# Patient Record
Sex: MALE | Race: ASIAN | NOT HISPANIC OR LATINO
[De-identification: names, ages, dates, MRNs, and addresses within clinical notes are randomized per-mention and may not be internally consistent; named-entity substitution may affect disease eponyms.]

---

## 2021-07-15 ENCOUNTER — APPOINTMENT (OUTPATIENT)
Dept: THORACIC SURGERY | Facility: CLINIC | Age: 76
End: 2021-07-15
Payer: MEDICARE

## 2021-07-15 VITALS
SYSTOLIC BLOOD PRESSURE: 102 MMHG | DIASTOLIC BLOOD PRESSURE: 67 MMHG | HEART RATE: 99 BPM | RESPIRATION RATE: 16 BRPM | OXYGEN SATURATION: 96 % | BODY MASS INDEX: 19.7 KG/M2 | TEMPERATURE: 97.6 F | WEIGHT: 114 LBS | HEIGHT: 63.78 IN

## 2021-07-15 DIAGNOSIS — F17.200 NICOTINE DEPENDENCE, UNSPECIFIED, UNCOMPLICATED: ICD-10-CM

## 2021-07-15 DIAGNOSIS — Z86.11 PERSONAL HISTORY OF TUBERCULOSIS: ICD-10-CM

## 2021-07-15 DIAGNOSIS — Z86.39 PERSONAL HISTORY OF OTHER ENDOCRINE, NUTRITIONAL AND METABOLIC DISEASE: ICD-10-CM

## 2021-07-15 DIAGNOSIS — R91.1 SOLITARY PULMONARY NODULE: ICD-10-CM

## 2021-07-15 DIAGNOSIS — Z87.438 PERSONAL HISTORY OF OTHER DISEASES OF MALE GENITAL ORGANS: ICD-10-CM

## 2021-07-15 PROCEDURE — 99072 ADDL SUPL MATRL&STAF TM PHE: CPT

## 2021-07-15 PROCEDURE — 99205 OFFICE O/P NEW HI 60 MIN: CPT

## 2021-07-16 PROBLEM — R91.1 LUNG NODULE: Status: ACTIVE | Noted: 2021-07-16

## 2021-07-16 PROBLEM — Z86.11 HISTORY OF TUBERCULOSIS: Status: RESOLVED | Noted: 2021-07-16 | Resolved: 2021-07-16

## 2021-07-16 PROBLEM — Z87.438 HISTORY OF BENIGN PROSTATIC HYPERPLASIA: Status: RESOLVED | Noted: 2021-07-16 | Resolved: 2021-07-16

## 2021-07-16 PROBLEM — F17.200 CURRENT SMOKER: Status: ACTIVE | Noted: 2021-07-16

## 2021-07-16 PROBLEM — Z86.39 HISTORY OF HYPERLIPIDEMIA: Status: RESOLVED | Noted: 2021-07-16 | Resolved: 2021-07-16

## 2021-07-16 RX ORDER — TAMSULOSIN HYDROCHLORIDE 0.4 MG/1
0.4 CAPSULE ORAL
Refills: 0 | Status: ACTIVE | COMMUNITY

## 2021-07-16 RX ORDER — ROSUVASTATIN CALCIUM 10 MG/1
10 TABLET, FILM COATED ORAL
Refills: 0 | Status: ACTIVE | COMMUNITY

## 2021-07-16 RX ORDER — MONTELUKAST 10 MG/1
10 TABLET, FILM COATED ORAL
Refills: 0 | Status: ACTIVE | COMMUNITY

## 2021-07-16 RX ORDER — ASPIRIN 81 MG
81 TABLET, DELAYED RELEASE (ENTERIC COATED) ORAL
Refills: 0 | Status: ACTIVE | COMMUNITY

## 2021-07-16 RX ORDER — LORATADINE 10 MG/1
10 TABLET ORAL
Refills: 0 | Status: ACTIVE | COMMUNITY

## 2021-07-16 NOTE — HISTORY OF PRESENT ILLNESS
[FreeTextEntry1] : Mr. PACO CASAREZ, 76 year old male, current smoker, w/ hx of HLD, +T.B. in the past (not treated), who presented to PCP Dr. Osmar Sutton for routine physical exam, sent for lung CA screening due to smoking hx. First CT scan in Oct 2020 which showed lung nodule.\par \par Repeat CT Chest on 6/26/21:\par - an increasing size 1.9 x 1.4 x 1cm ISIDRO nodule (4:55; previously 1.7 x 1.3 x 0.8cm)\par \par Of note, patient received Moderna vaccines on 3/30/21 and 4/27/21.\par Patient is here today for CT Sx consultation, referred by Dr. Osmar Sutton.\par

## 2021-07-16 NOTE — CONSULT LETTER
[Dear  ___] : Dear  [unfilled], [Consult Letter:] : I had the pleasure of evaluating your patient, [unfilled]. [( Thank you for referring [unfilled] for consultation for _____ )] : Thank you for referring [unfilled] for consultation for [unfilled] [Please see my note below.] : Please see my note below. [Consult Closing:] : Thank you very much for allowing me to participate in the care of this patient.  If you have any questions, please do not hesitate to contact me. [Sincerely,] : Sincerely, [FreeTextEntry2] : Dr. Osmar Sutton (PCP/Referring) [FreeTextEntry3] : Remigio Moore MD, MPH \par System Director of Thoracic Surgery \par Director of Comprehensive Lung and Foregut [place] \par Professor Cardiovascular & Thoracic Surgery  \par [facility]\par \par [facility]\par [address]\par Oncology [address]\[place] [address]\par Tel: [phone]\par Fax: [phone]\par

## 2021-07-16 NOTE — ASSESSMENT
[FreeTextEntry1] : Mr. PACO CASAREZ, 76 year old male, current smoker, w/ hx of HLD, +T.B. in the past (not treated), who presented with lung nodule.\par \par CT Chest on 6/26/21:\par - an increasing size 1.9 x 1.4 x 1cm ISIDRO nodule (4:55; previously 1.7 x 1.3 x 0.8cm)\par \par I have reviewed the patient's medical records and diagnostic images at time of this office consultation and have made the following recommendation:\par 1. CT scan reviewed, increasing size ISIDRO nodule is highly suspicious for malignancy, I recommended a Lt VATS Robotic-assisted, ISIDRO wedge rxn, possible LULobectomy, MLND on 8/11/21. Risks and benefits and alternatives explained to patient, all questions answered, patient agreed to proceed with surgery.\par 2. PFTs\par 3. PET/CT\par 4. Medical clearance and PST\par 5. HOLD ASA 81mg for 1 week prior to surgery.\par \par \par I personally performed the services described in the documentation, reviewed the documentation recorded by the scribe in my presence and it accurately and completely records my words and actions.\par \par I, Shukri Fabian NP, am scribing for and the presence of KENDELL Vaz, the following sections HISTORY OF PRESENT ILLNESS, PAST MEDICAL/FAMILY/SOCIAL HISTORY; REVIEW OF SYSTEMS; VITAL SIGNS; PHYSICAL EXAM; DISPOSITION.\par \par

## 2021-07-27 ENCOUNTER — OUTPATIENT (OUTPATIENT)
Dept: OUTPATIENT SERVICES | Facility: HOSPITAL | Age: 76
LOS: 1 days | End: 2021-07-27
Payer: MEDICARE

## 2021-07-27 VITALS
OXYGEN SATURATION: 96 % | HEART RATE: 69 BPM | TEMPERATURE: 98 F | HEIGHT: 63 IN | WEIGHT: 108.03 LBS | RESPIRATION RATE: 18 BRPM | DIASTOLIC BLOOD PRESSURE: 64 MMHG | SYSTOLIC BLOOD PRESSURE: 108 MMHG

## 2021-07-27 DIAGNOSIS — Z98.890 OTHER SPECIFIED POSTPROCEDURAL STATES: Chronic | ICD-10-CM

## 2021-07-27 DIAGNOSIS — K08.89 OTHER SPECIFIED DISORDERS OF TEETH AND SUPPORTING STRUCTURES: ICD-10-CM

## 2021-07-27 DIAGNOSIS — R91.8 OTHER NONSPECIFIC ABNORMAL FINDING OF LUNG FIELD: ICD-10-CM

## 2021-07-27 DIAGNOSIS — Z78.9 OTHER SPECIFIED HEALTH STATUS: ICD-10-CM

## 2021-07-27 LAB
ALBUMIN SERPL ELPH-MCNC: 4.6 G/DL — SIGNIFICANT CHANGE UP (ref 3.3–5)
ALP SERPL-CCNC: 115 U/L — SIGNIFICANT CHANGE UP (ref 40–120)
ALT FLD-CCNC: 29 U/L — SIGNIFICANT CHANGE UP (ref 4–41)
ANION GAP SERPL CALC-SCNC: 13 MMOL/L — SIGNIFICANT CHANGE UP (ref 7–14)
AST SERPL-CCNC: 18 U/L — SIGNIFICANT CHANGE UP (ref 4–40)
BILIRUB SERPL-MCNC: 0.4 MG/DL — SIGNIFICANT CHANGE UP (ref 0.2–1.2)
BLD GP AB SCN SERPL QL: NEGATIVE — SIGNIFICANT CHANGE UP
BUN SERPL-MCNC: 21 MG/DL — SIGNIFICANT CHANGE UP (ref 7–23)
CALCIUM SERPL-MCNC: 9.2 MG/DL — SIGNIFICANT CHANGE UP (ref 8.4–10.5)
CHLORIDE SERPL-SCNC: 109 MMOL/L — HIGH (ref 98–107)
CO2 SERPL-SCNC: 21 MMOL/L — LOW (ref 22–31)
CREAT SERPL-MCNC: 0.7 MG/DL — SIGNIFICANT CHANGE UP (ref 0.5–1.3)
GLUCOSE SERPL-MCNC: 128 MG/DL — HIGH (ref 70–99)
HCT VFR BLD CALC: 49.8 % — SIGNIFICANT CHANGE UP (ref 39–50)
HGB BLD-MCNC: 16.8 G/DL — SIGNIFICANT CHANGE UP (ref 13–17)
MCHC RBC-ENTMCNC: 32.3 PG — SIGNIFICANT CHANGE UP (ref 27–34)
MCHC RBC-ENTMCNC: 33.7 GM/DL — SIGNIFICANT CHANGE UP (ref 32–36)
MCV RBC AUTO: 95.8 FL — SIGNIFICANT CHANGE UP (ref 80–100)
NRBC # BLD: 0 /100 WBCS — SIGNIFICANT CHANGE UP
NRBC # FLD: 0 K/UL — SIGNIFICANT CHANGE UP
PLATELET # BLD AUTO: 303 K/UL — SIGNIFICANT CHANGE UP (ref 150–400)
POTASSIUM SERPL-MCNC: 3.6 MMOL/L — SIGNIFICANT CHANGE UP (ref 3.5–5.3)
POTASSIUM SERPL-SCNC: 3.6 MMOL/L — SIGNIFICANT CHANGE UP (ref 3.5–5.3)
PROT SERPL-MCNC: 7 G/DL — SIGNIFICANT CHANGE UP (ref 6–8.3)
RBC # BLD: 5.2 M/UL — SIGNIFICANT CHANGE UP (ref 4.2–5.8)
RBC # FLD: 13.7 % — SIGNIFICANT CHANGE UP (ref 10.3–14.5)
RH IG SCN BLD-IMP: POSITIVE — SIGNIFICANT CHANGE UP
SODIUM SERPL-SCNC: 143 MMOL/L — SIGNIFICANT CHANGE UP (ref 135–145)
WBC # BLD: 6.53 K/UL — SIGNIFICANT CHANGE UP (ref 3.8–10.5)
WBC # FLD AUTO: 6.53 K/UL — SIGNIFICANT CHANGE UP (ref 3.8–10.5)

## 2021-07-27 PROCEDURE — 93010 ELECTROCARDIOGRAM REPORT: CPT

## 2021-07-27 RX ORDER — SODIUM CHLORIDE 9 MG/ML
1000 INJECTION, SOLUTION INTRAVENOUS
Refills: 0 | Status: DISCONTINUED | OUTPATIENT
Start: 2021-08-11 | End: 2021-08-15

## 2021-07-27 RX ORDER — SODIUM CHLORIDE 9 MG/ML
3 INJECTION INTRAMUSCULAR; INTRAVENOUS; SUBCUTANEOUS EVERY 8 HOURS
Refills: 0 | Status: DISCONTINUED | OUTPATIENT
Start: 2021-08-11 | End: 2021-08-11

## 2021-07-27 NOTE — H&P PST ADULT - ASSESSMENT
77 y/o male Mandarin speaking presents with pre op diagnosis: other nonspecific abnormal finding of lung field

## 2021-07-27 NOTE — H&P PST ADULT - HISTORY OF PRESENT ILLNESS
77 y/o  male Mandarin speaking with   1 yr h/o lung nodule  75 y/o  male Mandarin speaking with h/o Dyslipidemia, BPH presents to PST for pre op evaluation with 1 year h/o lung nodule. S/P Ct scan of lung which revealed that nodule has increased in size. Now scheduled for robotic assisted left VAT, left upper lobe wedge resection, possible left upper lobectomy, mediastinal lymph node dissection on 08/11/2021  Interpretation services offered, pt refused. Requested family to interpret.

## 2021-07-27 NOTE — H&P PST ADULT - NSICDXPROBLEM_GEN_ALL_CORE_FT
PROBLEM DIAGNOSES  Problem: Other nonspecific abnormal finding of lung field  Assessment and Plan: Scheduled for robotic assisted left video assisted thoracoscopy, left upper lobe wedge resection, possible left upper lobectomy, mediastinal lymph node dissection on 08/11/2021. Pre op instructions, famotidine, chlorhexidine gluconate soap given and explained. Pt/family verbalized understanding.   Moderna vaccine received, OR booking notified via fax    Problem: Language barrier  Assessment and Plan: Mandarin speaking    Problem: Loose, teeth  Assessment and Plan: Pt/daughter in law Iris instructed to see dentist for consultation pre op.  Pending dental consult.

## 2021-08-07 PROBLEM — E78.5 HYPERLIPIDEMIA, UNSPECIFIED: Chronic | Status: ACTIVE | Noted: 2021-07-27

## 2021-08-07 PROBLEM — N40.0 BENIGN PROSTATIC HYPERPLASIA WITHOUT LOWER URINARY TRACT SYMPTOMS: Chronic | Status: ACTIVE | Noted: 2021-07-27

## 2021-08-09 ENCOUNTER — APPOINTMENT (OUTPATIENT)
Dept: DISASTER EMERGENCY | Facility: CLINIC | Age: 76
End: 2021-08-09

## 2021-08-09 DIAGNOSIS — Z01.818 ENCOUNTER FOR OTHER PREPROCEDURAL EXAMINATION: ICD-10-CM

## 2021-08-10 LAB — SARS-COV-2 N GENE NPH QL NAA+PROBE: NOT DETECTED

## 2021-08-11 ENCOUNTER — APPOINTMENT (OUTPATIENT)
Dept: THORACIC SURGERY | Facility: HOSPITAL | Age: 76
End: 2021-08-11

## 2021-08-11 ENCOUNTER — INPATIENT (INPATIENT)
Facility: HOSPITAL | Age: 76
LOS: 5 days | Discharge: HOME HEALTH | End: 2021-08-17
Attending: THORACIC SURGERY (CARDIOTHORACIC VASCULAR SURGERY) | Admitting: THORACIC SURGERY (CARDIOTHORACIC VASCULAR SURGERY)
Payer: MEDICARE

## 2021-08-11 ENCOUNTER — RESULT REVIEW (OUTPATIENT)
Age: 76
End: 2021-08-11

## 2021-08-11 VITALS
HEIGHT: 63 IN | HEART RATE: 60 BPM | OXYGEN SATURATION: 99 % | WEIGHT: 108.03 LBS | RESPIRATION RATE: 16 BRPM | SYSTOLIC BLOOD PRESSURE: 110 MMHG | TEMPERATURE: 98 F | DIASTOLIC BLOOD PRESSURE: 58 MMHG

## 2021-08-11 DIAGNOSIS — R91.8 OTHER NONSPECIFIC ABNORMAL FINDING OF LUNG FIELD: ICD-10-CM

## 2021-08-11 DIAGNOSIS — Z98.890 OTHER SPECIFIED POSTPROCEDURAL STATES: Chronic | ICD-10-CM

## 2021-08-11 LAB — RH IG SCN BLD-IMP: POSITIVE — SIGNIFICANT CHANGE UP

## 2021-08-11 PROCEDURE — 88307 TISSUE EXAM BY PATHOLOGIST: CPT | Mod: 26

## 2021-08-11 PROCEDURE — 32663 THORACOSCOPY W/LOBECTOMY: CPT

## 2021-08-11 PROCEDURE — 88342 IMHCHEM/IMCYTCHM 1ST ANTB: CPT | Mod: 26

## 2021-08-11 PROCEDURE — 32674 THORACOSCOPY LYMPH NODE EXC: CPT

## 2021-08-11 PROCEDURE — 99233 SBSQ HOSP IP/OBS HIGH 50: CPT

## 2021-08-11 PROCEDURE — S2900 ROBOTIC SURGICAL SYSTEM: CPT | Mod: NC

## 2021-08-11 PROCEDURE — 88309 TISSUE EXAM BY PATHOLOGIST: CPT | Mod: 26

## 2021-08-11 PROCEDURE — 32668 THORACOSCOPY W/W RESECT DIAG: CPT

## 2021-08-11 PROCEDURE — 32674 THORACOSCOPY LYMPH NODE EXC: CPT | Mod: AS

## 2021-08-11 PROCEDURE — 88341 IMHCHEM/IMCYTCHM EA ADD ANTB: CPT | Mod: 26

## 2021-08-11 PROCEDURE — 32668 THORACOSCOPY W/W RESECT DIAG: CPT | Mod: AS

## 2021-08-11 PROCEDURE — 32663 THORACOSCOPY W/LOBECTOMY: CPT | Mod: AS

## 2021-08-11 PROCEDURE — 88313 SPECIAL STAINS GROUP 2: CPT | Mod: 26

## 2021-08-11 PROCEDURE — 71045 X-RAY EXAM CHEST 1 VIEW: CPT | Mod: 26

## 2021-08-11 PROCEDURE — 88305 TISSUE EXAM BY PATHOLOGIST: CPT | Mod: 26

## 2021-08-11 RX ORDER — ASPIRIN/CALCIUM CARB/MAGNESIUM 324 MG
81 TABLET ORAL DAILY
Refills: 0 | Status: DISCONTINUED | OUTPATIENT
Start: 2021-08-11 | End: 2021-08-17

## 2021-08-11 RX ORDER — HYDROMORPHONE HYDROCHLORIDE 2 MG/ML
30 INJECTION INTRAMUSCULAR; INTRAVENOUS; SUBCUTANEOUS
Refills: 0 | Status: DISCONTINUED | OUTPATIENT
Start: 2021-08-11 | End: 2021-08-12

## 2021-08-11 RX ORDER — CHOLECALCIFEROL (VITAMIN D3) 125 MCG
1 CAPSULE ORAL
Qty: 0 | Refills: 0 | DISCHARGE

## 2021-08-11 RX ORDER — ACETAMINOPHEN 500 MG
750 TABLET ORAL ONCE
Refills: 0 | Status: DISCONTINUED | OUTPATIENT
Start: 2021-08-12 | End: 2021-08-12

## 2021-08-11 RX ORDER — HYDROMORPHONE HYDROCHLORIDE 2 MG/ML
0.5 INJECTION INTRAMUSCULAR; INTRAVENOUS; SUBCUTANEOUS
Refills: 0 | Status: DISCONTINUED | OUTPATIENT
Start: 2021-08-11 | End: 2021-08-12

## 2021-08-11 RX ORDER — SENNA PLUS 8.6 MG/1
2 TABLET ORAL AT BEDTIME
Refills: 0 | Status: DISCONTINUED | OUTPATIENT
Start: 2021-08-11 | End: 2021-08-17

## 2021-08-11 RX ORDER — HEPARIN SODIUM 5000 [USP'U]/ML
2500 INJECTION INTRAVENOUS; SUBCUTANEOUS ONCE
Refills: 0 | Status: COMPLETED | OUTPATIENT
Start: 2021-08-11 | End: 2021-08-11

## 2021-08-11 RX ORDER — TAMSULOSIN HYDROCHLORIDE 0.4 MG/1
0.4 CAPSULE ORAL AT BEDTIME
Refills: 0 | Status: DISCONTINUED | OUTPATIENT
Start: 2021-08-11 | End: 2021-08-17

## 2021-08-11 RX ORDER — NALOXONE HYDROCHLORIDE 4 MG/.1ML
0.1 SPRAY NASAL
Refills: 0 | Status: DISCONTINUED | OUTPATIENT
Start: 2021-08-11 | End: 2021-08-17

## 2021-08-11 RX ORDER — ASPIRIN/CALCIUM CARB/MAGNESIUM 324 MG
1 TABLET ORAL
Qty: 0 | Refills: 0 | DISCHARGE

## 2021-08-11 RX ORDER — ACETAMINOPHEN 500 MG
650 TABLET ORAL ONCE
Refills: 0 | Status: COMPLETED | OUTPATIENT
Start: 2021-08-11 | End: 2021-08-11

## 2021-08-11 RX ORDER — NALBUPHINE HYDROCHLORIDE 10 MG/ML
2.5 INJECTION, SOLUTION INTRAMUSCULAR; INTRAVENOUS; SUBCUTANEOUS EVERY 6 HOURS
Refills: 0 | Status: DISCONTINUED | OUTPATIENT
Start: 2021-08-11 | End: 2021-08-12

## 2021-08-11 RX ORDER — HEPARIN SODIUM 5000 [USP'U]/ML
2500 INJECTION INTRAVENOUS; SUBCUTANEOUS EVERY 12 HOURS
Refills: 0 | Status: DISCONTINUED | OUTPATIENT
Start: 2021-08-11 | End: 2021-08-17

## 2021-08-11 RX ORDER — ATORVASTATIN CALCIUM 80 MG/1
40 TABLET, FILM COATED ORAL AT BEDTIME
Refills: 0 | Status: DISCONTINUED | OUTPATIENT
Start: 2021-08-11 | End: 2021-08-17

## 2021-08-11 RX ORDER — BUDESONIDE AND FORMOTEROL FUMARATE DIHYDRATE 160; 4.5 UG/1; UG/1
2 AEROSOL RESPIRATORY (INHALATION)
Qty: 0 | Refills: 0 | DISCHARGE

## 2021-08-11 RX ORDER — BUDESONIDE AND FORMOTEROL FUMARATE DIHYDRATE 160; 4.5 UG/1; UG/1
2 AEROSOL RESPIRATORY (INHALATION)
Refills: 0 | Status: DISCONTINUED | OUTPATIENT
Start: 2021-08-11 | End: 2021-08-17

## 2021-08-11 RX ORDER — GABAPENTIN 400 MG/1
100 CAPSULE ORAL ONCE
Refills: 0 | Status: COMPLETED | OUTPATIENT
Start: 2021-08-11 | End: 2021-08-11

## 2021-08-11 RX ORDER — ROSUVASTATIN CALCIUM 5 MG/1
1 TABLET ORAL
Qty: 0 | Refills: 0 | DISCHARGE

## 2021-08-11 RX ORDER — POLYETHYLENE GLYCOL 3350 17 G/17G
17 POWDER, FOR SOLUTION ORAL DAILY
Refills: 0 | Status: DISCONTINUED | OUTPATIENT
Start: 2021-08-11 | End: 2021-08-17

## 2021-08-11 RX ORDER — DIPHENHYDRAMINE HCL 50 MG
25 CAPSULE ORAL EVERY 4 HOURS
Refills: 0 | Status: DISCONTINUED | OUTPATIENT
Start: 2021-08-11 | End: 2021-08-12

## 2021-08-11 RX ORDER — ACETAMINOPHEN 500 MG
750 TABLET ORAL ONCE
Refills: 0 | Status: COMPLETED | OUTPATIENT
Start: 2021-08-11 | End: 2021-08-12

## 2021-08-11 RX ORDER — ONDANSETRON 8 MG/1
4 TABLET, FILM COATED ORAL EVERY 6 HOURS
Refills: 0 | Status: DISCONTINUED | OUTPATIENT
Start: 2021-08-11 | End: 2021-08-17

## 2021-08-11 RX ADMIN — Medication 81 MILLIGRAM(S): at 21:41

## 2021-08-11 RX ADMIN — SODIUM CHLORIDE 30 MILLILITER(S): 9 INJECTION, SOLUTION INTRAVENOUS at 10:26

## 2021-08-11 RX ADMIN — HEPARIN SODIUM 2500 UNIT(S): 5000 INJECTION INTRAVENOUS; SUBCUTANEOUS at 10:25

## 2021-08-11 RX ADMIN — SENNA PLUS 2 TABLET(S): 8.6 TABLET ORAL at 21:41

## 2021-08-11 RX ADMIN — HEPARIN SODIUM 2500 UNIT(S): 5000 INJECTION INTRAVENOUS; SUBCUTANEOUS at 19:55

## 2021-08-11 RX ADMIN — ATORVASTATIN CALCIUM 40 MILLIGRAM(S): 80 TABLET, FILM COATED ORAL at 21:41

## 2021-08-11 RX ADMIN — Medication 650 MILLIGRAM(S): at 10:24

## 2021-08-11 RX ADMIN — HYDROMORPHONE HYDROCHLORIDE 30 MILLILITER(S): 2 INJECTION INTRAMUSCULAR; INTRAVENOUS; SUBCUTANEOUS at 19:23

## 2021-08-11 RX ADMIN — GABAPENTIN 100 MILLIGRAM(S): 400 CAPSULE ORAL at 10:24

## 2021-08-11 RX ADMIN — POLYETHYLENE GLYCOL 3350 17 GRAM(S): 17 POWDER, FOR SOLUTION ORAL at 21:40

## 2021-08-11 RX ADMIN — TAMSULOSIN HYDROCHLORIDE 0.4 MILLIGRAM(S): 0.4 CAPSULE ORAL at 21:41

## 2021-08-11 NOTE — PROGRESS NOTE ADULT - SUBJECTIVE AND OBJECTIVE BOX
PACO CASAREZ      76y   Male   MRN-[id number]         No Known Allergies             Daily Height in cm: 160.02 (11 Aug 2021 09:54)    Daily Drug Dosing Weight  Height (cm): 160 (11 Aug 2021 09:54)  Weight (kg): 49 (11 Aug 2021 09:54)  BMI (kg/m2): 19.1 (11 Aug 2021 09:54)  BSA (m2): 1.49 (11 Aug 2021 09:54)    HPI:  77 y/o  male Mandarin speaking with h/o Dyslipidemia, BPH presents to PST for pre op evaluation with 1 year h/o lung nodule. S/P Ct scan of lung which revealed that nodule has increased in size. Now scheduled for robotic assisted left VAT, left upper lobe wedge resection, possible left upper lobectomy, mediastinal lymph node dissection on 08/11/2021  Interpretation services offered, pt refused. Requested family to interpret.   (27 Jul 2021 11:00)      Procedure:  L robotic-assisted VATS ISIDRO wedge resection [frozen c/w NSCLC], completion ISIDRO lobectomy w/MLND  8/11/2021                         Issues:              Lung nodule              Postop pain  HLD  BPH  Chest tube in place      Home Medications:  aspirin 81 mg oral tablet: 1 tab(s) orally once a day; last dose 08/03 (11 Aug 2021 10:13)  Crestor 10 mg oral tablet: 1 tab(s) orally once a day (11 Aug 2021 10:13)  Flomax 0.4 mg oral capsule: 1 cap(s) orally once a day (11 Aug 2021 10:13)  Symbicort 160 mcg-4.5 mcg/inh inhalation aerosol: 2 puff(s) inhaled 2 times a day, As Needed (11 Aug 2021 10:13)  Vitamin D3 1250 mcg (50,000 intl units) oral capsule: 1 cap(s) orally once a week (11 Aug 2021 10:13)      PAST MEDICAL & SURGICAL HISTORY:  BPH (benign prostatic hyperplasia)    Dyslipidemia    H/O left knee surgery  fracture repair; 2014      Vital Signs Last 24 Hrs  T(C): 36.7 (11 Aug 2021 09:54), Max: 36.7 (11 Aug 2021 09:54)  T(F): 98.1 (11 Aug 2021 09:54), Max: 98.1 (11 Aug 2021 09:54)  HR: 94 (11 Aug 2021 15:00) (60 - 94)  BP: 106/60 (11 Aug 2021 15:00) (106/60 - 121/68)  BP(mean): 71 (11 Aug 2021 15:00) (71 - 84)  RR: 19 (11 Aug 2021 15:00) (13 - 22)  SpO2: 93% (11 Aug 2021 15:00) (93% - 99%)  I&O's Detail    11 Aug 2021 07:01  -  11 Aug 2021 15:17  --------------------------------------------------------  IN:    Lactated Ringers: 90 mL  Total IN: 90 mL    OUT:    Chest Tube (mL): 0 mL  Total OUT: 0 mL    Total NET: 90 mL        CAPILLARY BLOOD GLUCOSE      Home Medications:  aspirin 81 mg oral tablet: 1 tab(s) orally once a day; last dose 08/03 (11 Aug 2021 10:13)  Crestor 10 mg oral tablet: 1 tab(s) orally once a day (11 Aug 2021 10:13)  Flomax 0.4 mg oral capsule: 1 cap(s) orally once a day (11 Aug 2021 10:13)  Symbicort 160 mcg-4.5 mcg/inh inhalation aerosol: 2 puff(s) inhaled 2 times a day, As Needed (11 Aug 2021 10:13)  Vitamin D3 1250 mcg (50,000 intl units) oral capsule: 1 cap(s) orally once a week (11 Aug 2021 10:13)      MEDICATIONS  (STANDING):  aspirin  chewable 81 milliGRAM(s) Oral daily  atorvastatin 40 milliGRAM(s) Oral at bedtime  budesonide 160 MICROgram(s)/formoterol 4.5 MICROgram(s) Inhaler 2 Puff(s) Inhalation two times a day  heparin   Injectable 2500 Unit(s) SubCutaneous every 12 hours  HYDROmorphone PCA (1 mG/mL) 30 milliLiter(s) PCA Continuous PCA Continuous  lactated ringers. 1000 milliLiter(s) (30 mL/Hr) IV Continuous <Continuous>  polyethylene glycol 3350 17 Gram(s) Oral daily  senna 2 Tablet(s) Oral at bedtime  sodium chloride 0.9% lock flush 3 milliLiter(s) IV Push every 8 hours  tamsulosin 0.4 milliGRAM(s) Oral at bedtime    MEDICATIONS  (PRN):  diphenhydrAMINE   Injectable 25 milliGRAM(s) IV Push every 4 hours PRN Pruritus  HYDROmorphone PCA (1 mG/mL) Rescue Clinician Bolus 0.5 milliGRAM(s) IV Push every 15 minutes PRN for Pain Scale GREATER THAN 6  nalbuphine Injectable 2.5 milliGRAM(s) IV Push every 6 hours PRN Pruritus  naloxone Injectable 0.1 milliGRAM(s) IV Push every 3 minutes PRN For ANY of the following changes in patient status:  A. RR LESS THAN 10 breaths per minute, B. Oxygen saturation LESS THAN 90%, C. Sedation score of 6  ondansetron Injectable 4 milliGRAM(s) IV Push every 6 hours PRN Nausea        Physical exam:                             General:               Pt is awake, alert,  appears to be in pain but not in  distress                                                  Neuro:                  Nonfocal                             Cardiovascular:   S1 & S2, regular                          Respiratory:         Air entry is fair and equal on both sides, has bilateral conducted sounds                           GI:                          Soft, nondistended and nontender, Bowel sounds active                            Ext:                        No cyanosis or edema     Labs:                                                                   CXR:  Left chest tube in place, lungs are clear    Plan:    General: 76yMale s/p L robotic-assisted VATS ISIDRO wedge resection [frozen c/w NSCLC], completion ISIDRO lobectomy w/MLND  8/11/2021, experiencing  pain with deep breathing.                             Neuro:                                         Pain control with PCA /  Tylenol PRN                            Cardiovascular:                                          Continue hemodynamic monitoring.    HLD: On Lipitor                            Respiratory:                                         Pt is on 2L  nasal canula, wean off as tolerated                                          Comfortable, not in any distress.                                         Encourage incentive spirometry                                          Monitor chest tube output                                         Chest tube to suction, has air leak                                                               Continue bronchodilators, pulmonary toilet                            GI                                         On clear liquids, advance to DASH  diet as tolerated                                         Continue Zofran / Reglan for nausea - PRN	                                                                 Renal:                                         Continue LR  30cc/hr                                         Monitor I/Os and electrolytes     BPH: On Flomax                                                                                        Hem/ Onc:                                                                                  Monitor chest tube output &  signs of bleeding.                                          Follow CBC in AM                           Infectious disease:                                            Monitor for fever / leukocytosis.                                          All surgical incision / chest tube  sites look clean                            Endocrine                                             Continue Accu-Checks with coverage    Pt is on SQ Heparin and Venodyne boots for DVT prophylaxis.     Pertinent clinical, laboratory, radiographic, hemodynamic, echocardiographic, respiratory data, microbiologic data and chart were reviewed and analyzed frequently throughout the course of the day and night  Patient seen, examined and plan discussed with CT Surgeon   / CTICU team during rounds.    Status discussed with patient /  updated plan of care            Vickey Carbajal MD

## 2021-08-11 NOTE — BRIEF OPERATIVE NOTE - ASSISTANT(S)
Jacob Houston PGY6, Mario Gallegos PA-C, Charlie Regalado PA-C Jacob Devi PGY6, Mario Gallegos PA-C, Hua Peterson PA-C

## 2021-08-11 NOTE — BRIEF OPERATIVE NOTE - COMMENTS
ICS: Flovent 44mcg 2p BID - 0, miss asthma s/s:<2x/week, no nighttime cough, JACOB use:<2x/week, +EIB, activity limits, no known food and environmental triggers -colds and weather changes main trigger.
***This note is incomplete, to be completed after completion of the case

## 2021-08-11 NOTE — BRIEF OPERATIVE NOTE - OPERATION/FINDINGS
76M current smoker h/o TB (not treated) p/w 2x1.5cm ISIDRO nodule 76M current smoker h/o TB (not treated) p/w 2x1.5cm ISIDRO nodule now s/p L robotic-assisted VATS ISIDRO wedge resection [frozen c/w NSCLC], completion ISIRDO lobectomy w/MLND.

## 2021-08-12 LAB
ANION GAP SERPL CALC-SCNC: 14 MMOL/L — SIGNIFICANT CHANGE UP (ref 7–14)
ANION GAP SERPL CALC-SCNC: 17 MMOL/L — HIGH (ref 7–14)
BUN SERPL-MCNC: 20 MG/DL — SIGNIFICANT CHANGE UP (ref 7–23)
BUN SERPL-MCNC: 20 MG/DL — SIGNIFICANT CHANGE UP (ref 7–23)
CALCIUM SERPL-MCNC: 8.4 MG/DL — SIGNIFICANT CHANGE UP (ref 8.4–10.5)
CALCIUM SERPL-MCNC: 9 MG/DL — SIGNIFICANT CHANGE UP (ref 8.4–10.5)
CHLORIDE SERPL-SCNC: 101 MMOL/L — SIGNIFICANT CHANGE UP (ref 98–107)
CHLORIDE SERPL-SCNC: 102 MMOL/L — SIGNIFICANT CHANGE UP (ref 98–107)
CO2 SERPL-SCNC: 19 MMOL/L — LOW (ref 22–31)
CO2 SERPL-SCNC: 22 MMOL/L — SIGNIFICANT CHANGE UP (ref 22–31)
CREAT SERPL-MCNC: 0.61 MG/DL — SIGNIFICANT CHANGE UP (ref 0.5–1.3)
CREAT SERPL-MCNC: 0.68 MG/DL — SIGNIFICANT CHANGE UP (ref 0.5–1.3)
GLUCOSE SERPL-MCNC: 106 MG/DL — HIGH (ref 70–99)
GLUCOSE SERPL-MCNC: 107 MG/DL — HIGH (ref 70–99)
HCT VFR BLD CALC: 46.1 % — SIGNIFICANT CHANGE UP (ref 39–50)
HGB BLD-MCNC: 15.3 G/DL — SIGNIFICANT CHANGE UP (ref 13–17)
MAGNESIUM SERPL-MCNC: 1.9 MG/DL — SIGNIFICANT CHANGE UP (ref 1.6–2.6)
MCHC RBC-ENTMCNC: 31.8 PG — SIGNIFICANT CHANGE UP (ref 27–34)
MCHC RBC-ENTMCNC: 33.2 GM/DL — SIGNIFICANT CHANGE UP (ref 32–36)
MCV RBC AUTO: 95.8 FL — SIGNIFICANT CHANGE UP (ref 80–100)
NRBC # BLD: 0 /100 WBCS — SIGNIFICANT CHANGE UP
NRBC # FLD: 0 K/UL — SIGNIFICANT CHANGE UP
PHOSPHATE SERPL-MCNC: 3.9 MG/DL — SIGNIFICANT CHANGE UP (ref 2.5–4.5)
PLATELET # BLD AUTO: 344 K/UL — SIGNIFICANT CHANGE UP (ref 150–400)
POTASSIUM SERPL-MCNC: 4.2 MMOL/L — SIGNIFICANT CHANGE UP (ref 3.5–5.3)
POTASSIUM SERPL-MCNC: 5.7 MMOL/L — HIGH (ref 3.5–5.3)
POTASSIUM SERPL-SCNC: 4.2 MMOL/L — SIGNIFICANT CHANGE UP (ref 3.5–5.3)
POTASSIUM SERPL-SCNC: 5.7 MMOL/L — HIGH (ref 3.5–5.3)
RBC # BLD: 4.81 M/UL — SIGNIFICANT CHANGE UP (ref 4.2–5.8)
RBC # FLD: 13.7 % — SIGNIFICANT CHANGE UP (ref 10.3–14.5)
SODIUM SERPL-SCNC: 137 MMOL/L — SIGNIFICANT CHANGE UP (ref 135–145)
SODIUM SERPL-SCNC: 138 MMOL/L — SIGNIFICANT CHANGE UP (ref 135–145)
WBC # BLD: 10.91 K/UL — HIGH (ref 3.8–10.5)
WBC # FLD AUTO: 10.91 K/UL — HIGH (ref 3.8–10.5)

## 2021-08-12 PROCEDURE — 99233 SBSQ HOSP IP/OBS HIGH 50: CPT

## 2021-08-12 PROCEDURE — 71045 X-RAY EXAM CHEST 1 VIEW: CPT | Mod: 26

## 2021-08-12 RX ORDER — ACETAMINOPHEN 500 MG
650 TABLET ORAL EVERY 6 HOURS
Refills: 0 | Status: COMPLETED | OUTPATIENT
Start: 2021-08-12 | End: 2021-08-14

## 2021-08-12 RX ORDER — OXYCODONE HYDROCHLORIDE 5 MG/1
10 TABLET ORAL
Refills: 0 | Status: DISCONTINUED | OUTPATIENT
Start: 2021-08-12 | End: 2021-08-17

## 2021-08-12 RX ORDER — HYDROMORPHONE HYDROCHLORIDE 2 MG/ML
0.3 INJECTION INTRAMUSCULAR; INTRAVENOUS; SUBCUTANEOUS
Refills: 0 | Status: DISCONTINUED | OUTPATIENT
Start: 2021-08-12 | End: 2021-08-12

## 2021-08-12 RX ORDER — OXYCODONE HYDROCHLORIDE 5 MG/1
5 TABLET ORAL
Refills: 0 | Status: DISCONTINUED | OUTPATIENT
Start: 2021-08-12 | End: 2021-08-17

## 2021-08-12 RX ORDER — LIDOCAINE 4 G/100G
1 CREAM TOPICAL EVERY 24 HOURS
Refills: 0 | Status: DISCONTINUED | OUTPATIENT
Start: 2021-08-12 | End: 2021-08-12

## 2021-08-12 RX ORDER — METOCLOPRAMIDE HCL 10 MG
10 TABLET ORAL THREE TIMES A DAY
Refills: 0 | Status: DISCONTINUED | OUTPATIENT
Start: 2021-08-12 | End: 2021-08-17

## 2021-08-12 RX ORDER — ACETAMINOPHEN 500 MG
750 TABLET ORAL ONCE
Refills: 0 | Status: COMPLETED | OUTPATIENT
Start: 2021-08-12 | End: 2021-08-12

## 2021-08-12 RX ORDER — LIDOCAINE 4 G/100G
1 CREAM TOPICAL EVERY 24 HOURS
Refills: 0 | Status: COMPLETED | OUTPATIENT
Start: 2021-08-12 | End: 2021-08-16

## 2021-08-12 RX ADMIN — Medication 650 MILLIGRAM(S): at 16:00

## 2021-08-12 RX ADMIN — BUDESONIDE AND FORMOTEROL FUMARATE DIHYDRATE 2 PUFF(S): 160; 4.5 AEROSOL RESPIRATORY (INHALATION) at 22:41

## 2021-08-12 RX ADMIN — Medication 81 MILLIGRAM(S): at 12:58

## 2021-08-12 RX ADMIN — HEPARIN SODIUM 2500 UNIT(S): 5000 INJECTION INTRAVENOUS; SUBCUTANEOUS at 22:07

## 2021-08-12 RX ADMIN — OXYCODONE HYDROCHLORIDE 10 MILLIGRAM(S): 5 TABLET ORAL at 13:09

## 2021-08-12 RX ADMIN — SODIUM CHLORIDE 30 MILLILITER(S): 9 INJECTION, SOLUTION INTRAVENOUS at 09:46

## 2021-08-12 RX ADMIN — OXYCODONE HYDROCHLORIDE 5 MILLIGRAM(S): 5 TABLET ORAL at 18:12

## 2021-08-12 RX ADMIN — HYDROMORPHONE HYDROCHLORIDE 0.5 MILLIGRAM(S): 2 INJECTION INTRAMUSCULAR; INTRAVENOUS; SUBCUTANEOUS at 07:18

## 2021-08-12 RX ADMIN — HEPARIN SODIUM 2500 UNIT(S): 5000 INJECTION INTRAVENOUS; SUBCUTANEOUS at 07:52

## 2021-08-12 RX ADMIN — Medication 650 MILLIGRAM(S): at 15:06

## 2021-08-12 RX ADMIN — Medication 650 MILLIGRAM(S): at 22:13

## 2021-08-12 RX ADMIN — Medication 650 MILLIGRAM(S): at 23:13

## 2021-08-12 RX ADMIN — HYDROMORPHONE HYDROCHLORIDE 30 MILLILITER(S): 2 INJECTION INTRAMUSCULAR; INTRAVENOUS; SUBCUTANEOUS at 07:36

## 2021-08-12 RX ADMIN — OXYCODONE HYDROCHLORIDE 10 MILLIGRAM(S): 5 TABLET ORAL at 12:58

## 2021-08-12 RX ADMIN — Medication 300 MILLIGRAM(S): at 09:46

## 2021-08-12 RX ADMIN — TAMSULOSIN HYDROCHLORIDE 0.4 MILLIGRAM(S): 0.4 CAPSULE ORAL at 22:14

## 2021-08-12 RX ADMIN — POLYETHYLENE GLYCOL 3350 17 GRAM(S): 17 POWDER, FOR SOLUTION ORAL at 12:58

## 2021-08-12 RX ADMIN — BUDESONIDE AND FORMOTEROL FUMARATE DIHYDRATE 2 PUFF(S): 160; 4.5 AEROSOL RESPIRATORY (INHALATION) at 09:48

## 2021-08-12 RX ADMIN — Medication 750 MILLIGRAM(S): at 10:06

## 2021-08-12 RX ADMIN — ATORVASTATIN CALCIUM 40 MILLIGRAM(S): 80 TABLET, FILM COATED ORAL at 22:14

## 2021-08-12 RX ADMIN — LIDOCAINE 1 PATCH: 4 CREAM TOPICAL at 17:44

## 2021-08-12 RX ADMIN — LIDOCAINE 1 PATCH: 4 CREAM TOPICAL at 12:59

## 2021-08-12 RX ADMIN — Medication 10 MILLIGRAM(S): at 09:47

## 2021-08-12 RX ADMIN — SENNA PLUS 2 TABLET(S): 8.6 TABLET ORAL at 22:13

## 2021-08-12 NOTE — PROGRESS NOTE ADULT - SUBJECTIVE AND OBJECTIVE BOX
Anesthesia Pain Management Service- Attending Addendum    SUBJECTIVE: Patient's pain control adequate    Therapy:	  [ X] IV PCA	   [ ] Epidural           [ ] s/p Spinal Opoid              [ ] Postpartum infusion	  [ ] Patient controlled regional anesthesia (PCRA)    [ ] prn Analgesics    Allergies    No Known Allergies    Intolerances      MEDICATIONS  (STANDING):  acetaminophen    Suspension .. 650 milliGRAM(s) Oral every 6 hours  aspirin  chewable 81 milliGRAM(s) Oral daily  atorvastatin 40 milliGRAM(s) Oral at bedtime  budesonide 160 MICROgram(s)/formoterol 4.5 MICROgram(s) Inhaler 2 Puff(s) Inhalation two times a day  heparin   Injectable 2500 Unit(s) SubCutaneous every 12 hours  lactated ringers. 1000 milliLiter(s) (30 mL/Hr) IV Continuous <Continuous>  lidocaine   5% Patch 1 Patch Transdermal every 24 hours  polyethylene glycol 3350 17 Gram(s) Oral daily  senna 2 Tablet(s) Oral at bedtime  tamsulosin 0.4 milliGRAM(s) Oral at bedtime    MEDICATIONS  (PRN):  metoclopramide 10 milliGRAM(s) Oral three times a day PRN nausea  naloxone Injectable 0.1 milliGRAM(s) IV Push every 3 minutes PRN For ANY of the following changes in patient status:  A. RR LESS THAN 10 breaths per minute, B. Oxygen saturation LESS THAN 90%, C. Sedation score of 6  ondansetron Injectable 4 milliGRAM(s) IV Push every 6 hours PRN Nausea  oxyCODONE    Solution 5 milliGRAM(s) Oral every 3 hours PRN Moderate Pain (4 - 6)  oxyCODONE    Solution 10 milliGRAM(s) Oral every 3 hours PRN Severe Pain (7 - 10)      OBJECTIVE:   [X] No new signs     [ ] Other:    Side Effects:  [X ] None			[ ] Other:      ASSESSMENT/PLAN  -Discontinue current therapy    [ ] Therapy changed to:    [ ] IV PCA       [ ] Epidural     [ X] prn Analgesics     Comments: Pain management per primary team, APS to sign off

## 2021-08-12 NOTE — PROGRESS NOTE ADULT - SUBJECTIVE AND OBJECTIVE BOX
CHIEF COMPLAINT: F/u for post-operative in CT ICU for a patient who is s/p ISIDRO resection    PROCEDURE:     76M current smoker h/o TB (not treated) p/w 2x1.5cm ISIDRO nodule now s/p L robotic-assisted VATS ISIDRO wedge resection [frozen c/w NSCLC], completion ISIDRO lobectomy w/MLND.        ISSUES:                 Lung nodule              Postop pain  HLD  BPH  Chest tube in place      INTERVAL EVENTS:     Complains of dizziness on standing, pain      HISTORY:   Patient reports moderate pain at chest wall incision sites which is worse with coughing and deep breathing without associated fever or dyspnea. Pain is improved with use of pain meds.     PHYSICAL EXAM:   Gen: Comfortable, No acute distress  Eyes: Sclera white, Conjunctiva normal, Eyelids normal, Pupils symmetrical   ENT: Mucous membranes moist,  ,  ,    Neck: Trachea midline,  ,  ,  ,  ,    CV: Rate regular, Rhythm regular,  ,  ,    Resp: Breath sounds clear, No accessory muscles use, L chest tube in place,    Abd: Soft, Non-distended, Non-tender, Bowel sounds normal,  ,  ,    Skin: Warm, No peripheral edema of lower extremities,  ,    : No pardo  Neuro: Moving all 4 extremities,    Psych: A&Ox3      ASSESSMENT AND PLAN:     NEURO:  Post-operative Pain - Pain control with PCA and Tylenol IV PRN.          RESPIRATORY:  Hypoxia - Wean nasal cannula for goal O2sat above 92. Obtain CXR. Incentive spirometry. Chest PT and frequent suctioning. Continue bronchodilators. OOB to chair & ambulate w/ assistance. Continuous pulse oximetry for support & to prevent decompensation.       Chest tube – Pleurevac regulated suctioning. Monitor chest tube output.   +airleak, keep to suction          CARDIOVASCULAR:  Hemodynamically stable - Not on pressors. Continue hemodynamic monitoring.            RENAL:  Stable - Monitor IOs and electrolytes. Keep K above 4.0 and Mg above 2.0. Repeat K 4.2  Straight cath for urinary retention, monitor bladder scan. Continue flomax         GASTROINTESTINAL:  GI prophylaxis not indicated  Zofran and Reglan IV PRN for nausea  Regular consistency diet            HEMATOLOGIC:  No signs of active bleeding. Monitor Hgb in CBC in AM  DVT prophylaxis with heparin subQ and SCDs.         INFECTIOUS DISEASE:  No signs of active infection. Will monitor for fever and leukocytosis.          ENDOCRINE:  Stable – Monitor glucose fingersticks for goal 120-180.          Pertinent clinical, laboratory, radiographic, telemetry, hemodynamic, echocardiographic, respiratory data, microbiologic data and chart were reviewed by myself and analyzed frequently throughout the course of the day and night by myself.    Plan discussed at length with the CTICU staff and Attending CT Surgeon Dr. Remigio Moore    Patient's status was discussed with patient at bedside.           ________________________________________________      _________________________  VITAL SIGNS:  Vital Signs Last 24 Hrs  T(C): 36.7 (12 Aug 2021 12:00), Max: 36.7 (12 Aug 2021 12:00)  T(F): 98 (12 Aug 2021 12:00), Max: 98 (12 Aug 2021 12:00)  HR: 78 (12 Aug 2021 12:00) (62 - 105)  BP: 98/58 (12 Aug 2021 12:00) (89/55 - 121/68)  BP(mean): 65 (12 Aug 2021 12:00) (56 - 84)  RR: 22 (12 Aug 2021 12:00) (10 - 27)  SpO2: 95% (12 Aug 2021 12:00) (92% - 97%)  I/Os:   I&O's Detail    11 Aug 2021 07:01  -  12 Aug 2021 07:00  --------------------------------------------------------  IN:    Lactated Ringers: 570 mL  Total IN: 570 mL    OUT:    Chest Tube (mL): 10 mL    Intermittent Catheterization - Urethral (mL): 800 mL  Total OUT: 810 mL    Total NET: -240 mL      12 Aug 2021 07:01  -  12 Aug 2021 12:49  --------------------------------------------------------  IN:  Total IN: 0 mL    OUT:    Intermittent Catheterization - Urethral (mL): 650 mL  Total OUT: 650 mL    Total NET: -650 mL              MEDICATIONS:  MEDICATIONS  (STANDING):  acetaminophen    Suspension .. 650 milliGRAM(s) Oral every 6 hours  aspirin  chewable 81 milliGRAM(s) Oral daily  atorvastatin 40 milliGRAM(s) Oral at bedtime  budesonide 160 MICROgram(s)/formoterol 4.5 MICROgram(s) Inhaler 2 Puff(s) Inhalation two times a day  heparin   Injectable 2500 Unit(s) SubCutaneous every 12 hours  lactated ringers. 1000 milliLiter(s) (30 mL/Hr) IV Continuous <Continuous>  lidocaine   5% Patch 1 Patch Transdermal every 24 hours  polyethylene glycol 3350 17 Gram(s) Oral daily  senna 2 Tablet(s) Oral at bedtime  tamsulosin 0.4 milliGRAM(s) Oral at bedtime    MEDICATIONS  (PRN):  metoclopramide 10 milliGRAM(s) Oral three times a day PRN nausea  naloxone Injectable 0.1 milliGRAM(s) IV Push every 3 minutes PRN For ANY of the following changes in patient status:  A. RR LESS THAN 10 breaths per minute, B. Oxygen saturation LESS THAN 90%, C. Sedation score of 6  ondansetron Injectable 4 milliGRAM(s) IV Push every 6 hours PRN Nausea  oxyCODONE    Solution 5 milliGRAM(s) Oral every 3 hours PRN Moderate Pain (4 - 6)  oxyCODONE    Solution 10 milliGRAM(s) Oral every 3 hours PRN Severe Pain (7 - 10)      LABS:  Laboratory data was independently reviewed by me today.                           15.3   10.91 )-----------( 344      ( 12 Aug 2021 05:41 )             46.1     08-12    138  |  102  |  20  ----------------------------<  107<H>  4.2   |  22  |  0.68    Ca    9.0      12 Aug 2021 08:34  Phos  3.9     08-12  Mg     1.90     08-12                RADIOLOGY:   Radiology images were independently reviewed by me today. Reports were reviewed by me today.    Xray Chest 1 View- PORTABLE-Routine:   EXAM:  XR CHEST PORTABLE ROUTINE 1V      EXAM:  XR CHEST PORTABLE URGENT 1V        PROCEDURE DATE:  Aug 11 2021         INTERPRETATION:  TIME OF EXAM: August 11, 2021 at 1:27 PM and August 12 at 4:15 AM    CLINICAL INFORMATION: Post thoracotomy forlung cancer.    TECHNIQUE:   Portable chest    INTERPRETATION:    August 11 at 1:27 PM:    Left-sided chest tube is present. The lungs are clear, the heart is not enlarged and there is no effusion or pneumothorax.    August 12 at 4:15 AM:  No interval change. Left-sided chest tube is present post thoracotomy with loss of volume in this lung.    Lungs remain clear and there is no complicating effusion or pneumothorax.      COMPARISON:  None available      IMPRESSION:  Status post left thoracotomy with chest tube without complications.    --- End of Report ---              ASHLIE BANKS MD; Attending Radiologist  This document has been electronically signed. Aug 12 2021  7:15AM (08-12-21 @ 05:03)  Xray Chest 1 View- PORTABLE-Urgent:   EXAM:  XR CHEST PORTABLE ROUTINE 1V      EXAM:  XR CHEST PORTABLE URGENT 1V        PROCEDURE DATE:  Aug 11 2021         INTERPRETATION:  TIME OF EXAM: August 11, 2021 at 1:27 PM and August 12 at 4:15 AM    CLINICAL INFORMATION: Post thoracotomy forlung cancer.    TECHNIQUE:   Portable chest    INTERPRETATION:    August 11 at 1:27 PM:    Left-sided chest tube is present. The lungs are clear, the heart is not enlarged and there is no effusion or pneumothorax.    August 12 at 4:15 AM:  No interval change. Left-sided chest tube is present post thoracotomy with loss of volume in this lung.    Lungs remain clear and there is no complicating effusion or pneumothorax.      COMPARISON:  None available      IMPRESSION:  Status post left thoracotomy with chest tube without complications.    --- End of Report ---              ASHLIE BANKS MD; Attending Radiologist  This document has been electronically signed. Aug 12 2021  7:15AM (08-11-21 @ 13:58)

## 2021-08-12 NOTE — PROGRESS NOTE ADULT - SUBJECTIVE AND OBJECTIVE BOX
POST ANESTHESIA EVALUATION    76y Male POSTOP DAY 1 S/P     MENTAL STATUS: Patient participation [ x ] Awake     [  ] Arousable     [  ] Sedated    AIRWAY PATENCY: [ x ] Satisfactory  [  ] Other:     Vital Signs Last 24 Hrs  T(C): 36.7 (12 Aug 2021 12:00), Max: 36.7 (12 Aug 2021 12:00)  T(F): 98 (12 Aug 2021 12:00), Max: 98 (12 Aug 2021 12:00)  HR: 80 (12 Aug 2021 13:00) (62 - 105)  BP: 99/52 (12 Aug 2021 13:00) (89/55 - 121/68)  BP(mean): 63 (12 Aug 2021 13:00) (56 - 84)  RR: 23 (12 Aug 2021 13:00) (10 - 27)  SpO2: 94% (12 Aug 2021 13:00) (92% - 97%)  I&O's Summary    11 Aug 2021 07:01  -  12 Aug 2021 07:00  --------------------------------------------------------  IN: 570 mL / OUT: 810 mL / NET: -240 mL    12 Aug 2021 07:01  -  12 Aug 2021 13:26  --------------------------------------------------------  IN: 150 mL / OUT: 650 mL / NET: -500 mL          NAUSEA/ VOMITTING:  [ x ] NONE  [  ] CONTROLLED [  ] OTHER     PAIN: [x  ] CONTROLLED WITH CURRENT REGIMEN  [  ] OTHER    [x  ] NO APPARENT ANESTHESIA COMPLICATIONS      Comments:

## 2021-08-12 NOTE — PROGRESS NOTE ADULT - SUBJECTIVE AND OBJECTIVE BOX
Anesthesia Pain Management Service    SUBJECTIVE: Patient primarily Cantonese speaking.  ID# [id number] used. Patient states that he is been suing the IV PCA but he is not sure if he is getting any pain relief Patient's RN states that the patient is very dizzy when he tried getting OOB.    Pain Scale Score	At rest: 6/10___ 	With Activity: ___ 	[X ] Refer to charted pain scores    THERAPY:    [ ] IV PCA Morphine		[ ] 5 mg/mL	[ ] 1 mg/mL  [X ] IV PCA Hydromorphone	[ ] 5 mg/mL	[X ] 1 mg/mL  [ ] IV PCA Fentanyl		[ ] 50 micrograms/mL    Demand dose __0.2_ lockout __6_ (minutes) Continuous Rate _0__ Total: __7.4_   mg used (in past 24 hrs)      MEDICATIONS  (STANDING):  acetaminophen    Suspension .. 650 milliGRAM(s) Oral every 6 hours  aspirin  chewable 81 milliGRAM(s) Oral daily  atorvastatin 40 milliGRAM(s) Oral at bedtime  budesonide 160 MICROgram(s)/formoterol 4.5 MICROgram(s) Inhaler 2 Puff(s) Inhalation two times a day  heparin   Injectable 2500 Unit(s) SubCutaneous every 12 hours  lactated ringers. 1000 milliLiter(s) (30 mL/Hr) IV Continuous <Continuous>  lidocaine   4% Patch 1 Patch Transdermal every 24 hours  polyethylene glycol 3350 17 Gram(s) Oral daily  senna 2 Tablet(s) Oral at bedtime  tamsulosin 0.4 milliGRAM(s) Oral at bedtime    MEDICATIONS  (PRN):  metoclopramide 10 milliGRAM(s) Oral three times a day PRN nausea  naloxone Injectable 0.1 milliGRAM(s) IV Push every 3 minutes PRN For ANY of the following changes in patient status:  A. RR LESS THAN 10 breaths per minute, B. Oxygen saturation LESS THAN 90%, C. Sedation score of 6  ondansetron Injectable 4 milliGRAM(s) IV Push every 6 hours PRN Nausea  oxyCODONE    Solution 5 milliGRAM(s) Oral every 3 hours PRN Moderate Pain (4 - 6)  oxyCODONE    Solution 10 milliGRAM(s) Oral every 3 hours PRN Severe Pain (7 - 10)      OBJECTIVE: Patient laying in bed. CTX1 in place.    Sedation Score:	[ X] Alert	[ ] Drowsy 	[ ] Arousable	[ ] Asleep	[ ] Unresponsive    Side Effects:	[X ] None	[ ] Nausea	[ ] Vomiting	[ ] Pruritus  		[ ] Other:    Vital Signs Last 24 Hrs  T(C): 36.3 (12 Aug 2021 04:00), Max: 36.5 (11 Aug 2021 16:00)  T(F): 97.3 (12 Aug 2021 04:00), Max: 97.7 (11 Aug 2021 16:00)  HR: 62 (12 Aug 2021 11:00) (62 - 105)  BP: 89/55 (12 Aug 2021 11:00) (89/55 - 121/68)  BP(mean): 63 (12 Aug 2021 11:00) (56 - 84)  RR: 14 (12 Aug 2021 11:00) (10 - 27)  SpO2: 95% (12 Aug 2021 11:00) (92% - 97%)    ASSESSMENT/ PLAN    Therapy to  be:	[ ] Continue   [ X] Discontinued   [X ] Change to prn Analgesics    Documentation and Verification of current medications:   [X] Done	[ ] Not done, not elligible    Comments: Discussed patient with primary team. IV PCA discontinued. PRN Oral/IV opioids and/or Adjuvant non-opioid medication to be ordered at this point.    Progress Note written now but Patient was seen earlier.

## 2021-08-13 LAB
ANION GAP SERPL CALC-SCNC: 15 MMOL/L — HIGH (ref 7–14)
BUN SERPL-MCNC: 22 MG/DL — SIGNIFICANT CHANGE UP (ref 7–23)
CALCIUM SERPL-MCNC: 8.8 MG/DL — SIGNIFICANT CHANGE UP (ref 8.4–10.5)
CHLORIDE SERPL-SCNC: 102 MMOL/L — SIGNIFICANT CHANGE UP (ref 98–107)
CO2 SERPL-SCNC: 22 MMOL/L — SIGNIFICANT CHANGE UP (ref 22–31)
CREAT SERPL-MCNC: 0.78 MG/DL — SIGNIFICANT CHANGE UP (ref 0.5–1.3)
GLUCOSE SERPL-MCNC: 108 MG/DL — HIGH (ref 70–99)
HCT VFR BLD CALC: 45.1 % — SIGNIFICANT CHANGE UP (ref 39–50)
HGB BLD-MCNC: 15.3 G/DL — SIGNIFICANT CHANGE UP (ref 13–17)
MCHC RBC-ENTMCNC: 32 PG — SIGNIFICANT CHANGE UP (ref 27–34)
MCHC RBC-ENTMCNC: 33.9 GM/DL — SIGNIFICANT CHANGE UP (ref 32–36)
MCV RBC AUTO: 94.4 FL — SIGNIFICANT CHANGE UP (ref 80–100)
NRBC # BLD: 0 /100 WBCS — SIGNIFICANT CHANGE UP
NRBC # FLD: 0 K/UL — SIGNIFICANT CHANGE UP
PLATELET # BLD AUTO: 275 K/UL — SIGNIFICANT CHANGE UP (ref 150–400)
POTASSIUM SERPL-MCNC: 3.9 MMOL/L — SIGNIFICANT CHANGE UP (ref 3.5–5.3)
POTASSIUM SERPL-SCNC: 3.9 MMOL/L — SIGNIFICANT CHANGE UP (ref 3.5–5.3)
RBC # BLD: 4.78 M/UL — SIGNIFICANT CHANGE UP (ref 4.2–5.8)
RBC # FLD: 13.8 % — SIGNIFICANT CHANGE UP (ref 10.3–14.5)
SODIUM SERPL-SCNC: 139 MMOL/L — SIGNIFICANT CHANGE UP (ref 135–145)
WBC # BLD: 10.46 K/UL — SIGNIFICANT CHANGE UP (ref 3.8–10.5)
WBC # FLD AUTO: 10.46 K/UL — SIGNIFICANT CHANGE UP (ref 3.8–10.5)

## 2021-08-13 PROCEDURE — 71045 X-RAY EXAM CHEST 1 VIEW: CPT | Mod: 26,76

## 2021-08-13 RX ADMIN — HEPARIN SODIUM 2500 UNIT(S): 5000 INJECTION INTRAVENOUS; SUBCUTANEOUS at 08:55

## 2021-08-13 RX ADMIN — TAMSULOSIN HYDROCHLORIDE 0.4 MILLIGRAM(S): 0.4 CAPSULE ORAL at 22:13

## 2021-08-13 RX ADMIN — Medication 650 MILLIGRAM(S): at 04:45

## 2021-08-13 RX ADMIN — SENNA PLUS 2 TABLET(S): 8.6 TABLET ORAL at 22:12

## 2021-08-13 RX ADMIN — LIDOCAINE 1 PATCH: 4 CREAM TOPICAL at 18:08

## 2021-08-13 RX ADMIN — BUDESONIDE AND FORMOTEROL FUMARATE DIHYDRATE 2 PUFF(S): 160; 4.5 AEROSOL RESPIRATORY (INHALATION) at 08:44

## 2021-08-13 RX ADMIN — POLYETHYLENE GLYCOL 3350 17 GRAM(S): 17 POWDER, FOR SOLUTION ORAL at 11:56

## 2021-08-13 RX ADMIN — ATORVASTATIN CALCIUM 40 MILLIGRAM(S): 80 TABLET, FILM COATED ORAL at 22:13

## 2021-08-13 RX ADMIN — Medication 650 MILLIGRAM(S): at 22:42

## 2021-08-13 RX ADMIN — OXYCODONE HYDROCHLORIDE 5 MILLIGRAM(S): 5 TABLET ORAL at 09:55

## 2021-08-13 RX ADMIN — Medication 650 MILLIGRAM(S): at 22:12

## 2021-08-13 RX ADMIN — HEPARIN SODIUM 2500 UNIT(S): 5000 INJECTION INTRAVENOUS; SUBCUTANEOUS at 20:18

## 2021-08-13 RX ADMIN — Medication 650 MILLIGRAM(S): at 17:25

## 2021-08-13 RX ADMIN — LIDOCAINE 1 PATCH: 4 CREAM TOPICAL at 00:00

## 2021-08-13 RX ADMIN — Medication 81 MILLIGRAM(S): at 11:57

## 2021-08-13 RX ADMIN — Medication 650 MILLIGRAM(S): at 11:56

## 2021-08-13 RX ADMIN — LIDOCAINE 1 PATCH: 4 CREAM TOPICAL at 20:20

## 2021-08-13 RX ADMIN — BUDESONIDE AND FORMOTEROL FUMARATE DIHYDRATE 2 PUFF(S): 160; 4.5 AEROSOL RESPIRATORY (INHALATION) at 20:18

## 2021-08-13 RX ADMIN — OXYCODONE HYDROCHLORIDE 5 MILLIGRAM(S): 5 TABLET ORAL at 08:55

## 2021-08-13 NOTE — PROGRESS NOTE ADULT - SUBJECTIVE AND OBJECTIVE BOX
Subjective: no acute complaints  pt admits discomfort at chest tube site, taking oral pain medications  OOB ambulating with minimal assistance  chest tube placed to waterseal today, chest tube remains for air leak  voiding without complaints    Vital Signs:  Vital Signs Last 24 Hrs  T(C): 36.9 (08-13-21 @ 07:04), Max: 37.3 (08-12-21 @ 20:42)  T(F): 98.4 (08-13-21 @ 07:04), Max: 99.2 (08-12-21 @ 20:42)  HR: 87 (08-13-21 @ 07:04) (62 - 87)  BP: 101/51 (08-13-21 @ 07:04) (89/49 - 107/53)  RR: 18 (08-13-21 @ 07:04) (12 - 23)  SpO2: 97% (08-13-21 @ 07:04) (92% - 98%) on (O2)    Telemetry/Alarms: sr  General: WN/WD NAD  Neurology: Awake, nonfocal, MICHAELS x 4  Eyes: Scleras clear, PERRLA/ EOMI, Gross vision intact  ENT:Gross hearing intact, grossly patent pharynx, no stridor  Neck: Neck supple, trachea midline, No JVD,   Respiratory: CTA B/L, No wheezing, rales, rhonchi  CV: RRR, S1S2, no murmurs, rubs or gallops  Abdominal: Soft, NT, ND +BS,   Extremities: No edema, + peripheral pulses  Skin: No Rashes, Hematoma, Ecchymosis  Lymphatic: No Neck, axilla, groin LAD  Psych: Oriented x 3, normal affect  Incisions: c,d,i  Tubes: chest tube was on suction and placed to waterseal today; chest tube drained 150cc/24h with expiratory air leak  Relevant labs, radiology and Medications reviewed                        15.3   10.46 )-----------( 275      ( 13 Aug 2021 06:46 )             45.1     08-13    139  |  102  |  22  ----------------------------<  108<H>  3.9   |  22  |  0.78    Ca    8.8      13 Aug 2021 06:46  Phos  3.9     08-12  Mg     1.90     08-12        MEDICATIONS  (STANDING):  acetaminophen    Suspension .. 650 milliGRAM(s) Oral every 6 hours  aspirin  chewable 81 milliGRAM(s) Oral daily  atorvastatin 40 milliGRAM(s) Oral at bedtime  budesonide 160 MICROgram(s)/formoterol 4.5 MICROgram(s) Inhaler 2 Puff(s) Inhalation two times a day  heparin   Injectable 2500 Unit(s) SubCutaneous every 12 hours  lactated ringers. 1000 milliLiter(s) (30 mL/Hr) IV Continuous <Continuous>  lidocaine   5% Patch 1 Patch Transdermal every 24 hours  polyethylene glycol 3350 17 Gram(s) Oral daily  senna 2 Tablet(s) Oral at bedtime  tamsulosin 0.4 milliGRAM(s) Oral at bedtime    MEDICATIONS  (PRN):  metoclopramide 10 milliGRAM(s) Oral three times a day PRN nausea  naloxone Injectable 0.1 milliGRAM(s) IV Push every 3 minutes PRN For ANY of the following changes in patient status:  A. RR LESS THAN 10 breaths per minute, B. Oxygen saturation LESS THAN 90%, C. Sedation score of 6  ondansetron Injectable 4 milliGRAM(s) IV Push every 6 hours PRN Nausea  oxyCODONE    Solution 10 milliGRAM(s) Oral every 3 hours PRN Severe Pain (7 - 10)  oxyCODONE    Solution 5 milliGRAM(s) Oral every 3 hours PRN Moderate Pain (4 - 6)    Pertinent Physical Exam  I&O's Summary    12 Aug 2021 07:01  -  13 Aug 2021 07:00  --------------------------------------------------------  IN: 150 mL / OUT: 1650 mL / NET: -1500 mL        Assessment  76y Male  w/ PAST MEDICAL & SURGICAL HISTORY:  BPH (benign prostatic hyperplasia)  Dyslipidemia  H/O left knee surgery  fracture repair; 2014    s/p ISIDRO wedge resection, completion lobectomy on 8/11/2021    . Postoperative course/issues: chest tube remains for air leak, pt initially required straight cath but is now urinating without difficulty    PLAN  Neuro: Pain management  Pulm: Encourage coughing, deep breathing and use of incentive spirometry. Wean off supplemental oxygen as able. Daily CXR.   Cardio: Monitor telemetry/alarms  GI: Tolerating diet. Continue stool softeners.  Renal: monitor urine output, supplement electrolytes as needed  Vasc: Heparin SC/SCDs for DVT prophylaxis  Heme: Stable H/H. .   ID: Off antibiotics. Stable.  Therapy: OOB/ambulate  Tubes: Monitor Chest tube output and air leak status  Disposition: Aim to D/C to home once air leak resolves and chest tube removed; waterseal trial today - will f/u repeat CXR  Discussed with Cardiothoracic Team at AM rounds.

## 2021-08-14 PROCEDURE — 71045 X-RAY EXAM CHEST 1 VIEW: CPT | Mod: 26

## 2021-08-14 RX ADMIN — LIDOCAINE 1 PATCH: 4 CREAM TOPICAL at 06:54

## 2021-08-14 RX ADMIN — OXYCODONE HYDROCHLORIDE 5 MILLIGRAM(S): 5 TABLET ORAL at 15:08

## 2021-08-14 RX ADMIN — Medication 81 MILLIGRAM(S): at 12:11

## 2021-08-14 RX ADMIN — Medication 650 MILLIGRAM(S): at 04:01

## 2021-08-14 RX ADMIN — BUDESONIDE AND FORMOTEROL FUMARATE DIHYDRATE 2 PUFF(S): 160; 4.5 AEROSOL RESPIRATORY (INHALATION) at 08:44

## 2021-08-14 RX ADMIN — Medication 650 MILLIGRAM(S): at 04:31

## 2021-08-14 RX ADMIN — BUDESONIDE AND FORMOTEROL FUMARATE DIHYDRATE 2 PUFF(S): 160; 4.5 AEROSOL RESPIRATORY (INHALATION) at 20:20

## 2021-08-14 RX ADMIN — OXYCODONE HYDROCHLORIDE 5 MILLIGRAM(S): 5 TABLET ORAL at 08:44

## 2021-08-14 RX ADMIN — POLYETHYLENE GLYCOL 3350 17 GRAM(S): 17 POWDER, FOR SOLUTION ORAL at 12:11

## 2021-08-14 RX ADMIN — SENNA PLUS 2 TABLET(S): 8.6 TABLET ORAL at 21:57

## 2021-08-14 RX ADMIN — HEPARIN SODIUM 2500 UNIT(S): 5000 INJECTION INTRAVENOUS; SUBCUTANEOUS at 09:05

## 2021-08-14 RX ADMIN — OXYCODONE HYDROCHLORIDE 5 MILLIGRAM(S): 5 TABLET ORAL at 09:40

## 2021-08-14 RX ADMIN — Medication 650 MILLIGRAM(S): at 12:12

## 2021-08-14 RX ADMIN — ATORVASTATIN CALCIUM 40 MILLIGRAM(S): 80 TABLET, FILM COATED ORAL at 21:57

## 2021-08-14 RX ADMIN — HEPARIN SODIUM 2500 UNIT(S): 5000 INJECTION INTRAVENOUS; SUBCUTANEOUS at 20:20

## 2021-08-14 RX ADMIN — TAMSULOSIN HYDROCHLORIDE 0.4 MILLIGRAM(S): 0.4 CAPSULE ORAL at 21:57

## 2021-08-14 RX ADMIN — LIDOCAINE 1 PATCH: 4 CREAM TOPICAL at 12:11

## 2021-08-14 RX ADMIN — OXYCODONE HYDROCHLORIDE 5 MILLIGRAM(S): 5 TABLET ORAL at 15:48

## 2021-08-14 RX ADMIN — LIDOCAINE 1 PATCH: 4 CREAM TOPICAL at 19:30

## 2021-08-14 NOTE — PROGRESS NOTE ADULT - SUBJECTIVE AND OBJECTIVE BOX
Subjective: no acute complaints  pain controlled  pt ambulatory   failed waterseal trial yesterday    Vital Signs:  Vital Signs Last 24 Hrs  T(C): 36.4 (08-14-21 @ 04:10), Max: 37.3 (08-13-21 @ 12:43)  T(F): 97.5 (08-14-21 @ 04:10), Max: 99.1 (08-13-21 @ 12:43)  HR: 93 (08-14-21 @ 04:10) (83 - 122)  BP: 122/72 (08-14-21 @ 04:10) (90/50 - 122/72)  RR: 18 (08-14-21 @ 04:10) (17 - 18)  SpO2: 98% (08-14-21 @ 04:10) (96% - 99%) on (O2)    Telemetry/Alarms:  General: WN/WD NAD  Neurology: Awake, nonfocal, MICHAELS x 4  Eyes: Scleras clear, PERRLA/ EOMI, Gross vision intact  ENT:Gross hearing intact, grossly patent pharynx, no stridor  Neck: Neck supple, trachea midline, No JVD,   Respiratory: CTA B/L, No wheezing, rales, rhonchi  CV: RRR, S1S2, no murmurs, rubs or gallops  Abdominal: Soft, NT, ND +BS,   Extremities: No edema, + peripheral pulses  Skin: No Rashes, Hematoma, Ecchymosis  Lymphatic: No Neck, axilla, groin LAD  Psych: Oriented x 3, normal affect  Incisions: c,d,i  Tubes: left chest tube on suction drained 220 /24h with expiratory air leak  Relevant labs, radiology and Medications reviewed                        15.3   10.46 )-----------( 275      ( 13 Aug 2021 06:46 )             45.1     08-13    139  |  102  |  22  ----------------------------<  108<H>  3.9   |  22  |  0.78    Ca    8.8      13 Aug 2021 06:46        MEDICATIONS  (STANDING):  acetaminophen    Suspension .. 650 milliGRAM(s) Oral every 6 hours  aspirin  chewable 81 milliGRAM(s) Oral daily  atorvastatin 40 milliGRAM(s) Oral at bedtime  budesonide 160 MICROgram(s)/formoterol 4.5 MICROgram(s) Inhaler 2 Puff(s) Inhalation two times a day  heparin   Injectable 2500 Unit(s) SubCutaneous every 12 hours  lactated ringers. 1000 milliLiter(s) (30 mL/Hr) IV Continuous <Continuous>  lidocaine   5% Patch 1 Patch Transdermal every 24 hours  polyethylene glycol 3350 17 Gram(s) Oral daily  senna 2 Tablet(s) Oral at bedtime  tamsulosin 0.4 milliGRAM(s) Oral at bedtime    MEDICATIONS  (PRN):  metoclopramide 10 milliGRAM(s) Oral three times a day PRN nausea  naloxone Injectable 0.1 milliGRAM(s) IV Push every 3 minutes PRN For ANY of the following changes in patient status:  A. RR LESS THAN 10 breaths per minute, B. Oxygen saturation LESS THAN 90%, C. Sedation score of 6  ondansetron Injectable 4 milliGRAM(s) IV Push every 6 hours PRN Nausea  oxyCODONE    Solution 5 milliGRAM(s) Oral every 3 hours PRN Moderate Pain (4 - 6)  oxyCODONE    Solution 10 milliGRAM(s) Oral every 3 hours PRN Severe Pain (7 - 10)    Pertinent Physical Exam  I&O's Summary    13 Aug 2021 07:01  -  14 Aug 2021 07:00  --------------------------------------------------------  IN: 200 mL / OUT: 1020 mL / NET: -820 mL        Assessment  76y Male  w/ PAST MEDICAL & SURGICAL HISTORY:  BPH (benign prostatic hyperplasia)  Dyslipidemia  H/O left knee surgery  fracture repair; 2014    pt  s/p ISIDRO wedge resection, completion lobectomy (11 Aug 2021 12:48)    . Postoperative course/issues: chest tube remains on suction with air leak, failed waterseal trial yesterday    PLAN  Neuro: Pain management  Pulm: Encourage coughing, deep breathing and use of incentive spirometry. Wean off supplemental oxygen as able. Daily CXR.   Cardio: Monitor telemetry/alarms  GI: Tolerating diet. Continue stool softeners.  Renal: monitor urine output, supplement electrolytes as needed  Vasc: Heparin SC/SCDs for DVT prophylaxis  Heme: Stable H/H. .   ID: Off antibiotics. Stable.  Therapy: OOB/ambulate  Tubes: Monitor Chest tube output and air leak status, continue chest tube to suction  Disposition: Aim to D/C to home once chest tube can be removed  Discussed with Cardiothoracic Team at AM rounds.

## 2021-08-15 PROCEDURE — 71045 X-RAY EXAM CHEST 1 VIEW: CPT | Mod: 26

## 2021-08-15 RX ADMIN — SENNA PLUS 2 TABLET(S): 8.6 TABLET ORAL at 21:51

## 2021-08-15 RX ADMIN — Medication 81 MILLIGRAM(S): at 12:20

## 2021-08-15 RX ADMIN — HEPARIN SODIUM 2500 UNIT(S): 5000 INJECTION INTRAVENOUS; SUBCUTANEOUS at 10:08

## 2021-08-15 RX ADMIN — BUDESONIDE AND FORMOTEROL FUMARATE DIHYDRATE 2 PUFF(S): 160; 4.5 AEROSOL RESPIRATORY (INHALATION) at 21:52

## 2021-08-15 RX ADMIN — OXYCODONE HYDROCHLORIDE 5 MILLIGRAM(S): 5 TABLET ORAL at 13:20

## 2021-08-15 RX ADMIN — LIDOCAINE 1 PATCH: 4 CREAM TOPICAL at 20:00

## 2021-08-15 RX ADMIN — BUDESONIDE AND FORMOTEROL FUMARATE DIHYDRATE 2 PUFF(S): 160; 4.5 AEROSOL RESPIRATORY (INHALATION) at 10:08

## 2021-08-15 RX ADMIN — TAMSULOSIN HYDROCHLORIDE 0.4 MILLIGRAM(S): 0.4 CAPSULE ORAL at 21:52

## 2021-08-15 RX ADMIN — HEPARIN SODIUM 2500 UNIT(S): 5000 INJECTION INTRAVENOUS; SUBCUTANEOUS at 21:51

## 2021-08-15 RX ADMIN — OXYCODONE HYDROCHLORIDE 5 MILLIGRAM(S): 5 TABLET ORAL at 12:20

## 2021-08-15 RX ADMIN — LIDOCAINE 1 PATCH: 4 CREAM TOPICAL at 12:21

## 2021-08-15 RX ADMIN — LIDOCAINE 1 PATCH: 4 CREAM TOPICAL at 00:00

## 2021-08-15 RX ADMIN — ATORVASTATIN CALCIUM 40 MILLIGRAM(S): 80 TABLET, FILM COATED ORAL at 21:51

## 2021-08-15 NOTE — PROGRESS NOTE ADULT - SUBJECTIVE AND OBJECTIVE BOX
Subjective: 75 y/o male seen in room this morning. Patient was sitting up OOBTC but was noted to be ambulating within room prior. Patient continues to become tacchycardic to 110's and SOB with movement, requires 3L NC at all times. Left CT remains in place to sxn with passive AL.   Otherwise patient in NAD, complain on "Pain to chest and back"    Vital Signs:  Vital Signs Last 24 Hrs  T(C): 36.8 (08-15-21 @ 04:34), Max: 37 (08-15-21 @ 01:37)  T(F): 98.2 (08-15-21 @ 04:34), Max: 98.6 (08-15-21 @ 01:37)  HR: 94 (08-15-21 @ 04:34) (87 - 110)  BP: 108/71 (08-15-21 @ 04:34) (97/63 - 114/67)  RR: 18 (08-15-21 @ 04:34) (17 - 18)  SpO2: 96% (08-15-21 @ 04:34) (92% - 100%) on (O2)    Pertinent Physical Exam:  Telemetry/Alarms: NSR 90s at rest, 120's with ambulation.   General: WN/WD NAD  Neurology: Awake, nonfocal, MICHAELS x 4  Eyes: Scleras clear, PERRLA/ EOMI, Gross vision intact  ENT:Gross hearing intact, grossly patent pharynx, no stridor  Neck: Neck supple, trachea midline, No JVD,   Respiratory: CTA B/L, No wheezing, rales, rhonchi  CV: RRR, S1S2, no murmurs, rubs or gallops  Abdominal: Soft, NT, ND +BS,   Extremities: No edema, + peripheral pulses  Skin: No Rashes, Hematoma, Ecchymosis  Lymphatic: No Neck, axilla, groin LAD  Psych: Oriented x 3, normal affect  Incisions: CDI  Tubes: Left with AL    Chest Tube: Left Side    Air Leak: Yes[x] / No[]    Drainage: 50cc    I&O's Summary    14 Aug 2021 07:01  -  15 Aug 2021 07:00  --------------------------------------------------------  IN: 250 mL / OUT: 50 mL / NET: 200 mL        Relevant labs, radiology and Medications reviewed      CXR: 8/15 pending official read  CT on right in place. appears unchaged from yesterday      MEDICATIONS  (STANDING):  aspirin  chewable 81 milliGRAM(s) Oral daily  atorvastatin 40 milliGRAM(s) Oral at bedtime  budesonide 160 MICROgram(s)/formoterol 4.5 MICROgram(s) Inhaler 2 Puff(s) Inhalation two times a day  heparin   Injectable 2500 Unit(s) SubCutaneous every 12 hours  lidocaine   5% Patch 1 Patch Transdermal every 24 hours  polyethylene glycol 3350 17 Gram(s) Oral daily  senna 2 Tablet(s) Oral at bedtime  tamsulosin 0.4 milliGRAM(s) Oral at bedtime    MEDICATIONS  (PRN):  metoclopramide 10 milliGRAM(s) Oral three times a day PRN nausea  naloxone Injectable 0.1 milliGRAM(s) IV Push every 3 minutes PRN For ANY of the following changes in patient status:  A. RR LESS THAN 10 breaths per minute, B. Oxygen saturation LESS THAN 90%, C. Sedation score of 6  ondansetron Injectable 4 milliGRAM(s) IV Push every 6 hours PRN Nausea  oxyCODONE    Solution 5 milliGRAM(s) Oral every 3 hours PRN Moderate Pain (4 - 6)  oxyCODONE    Solution 10 milliGRAM(s) Oral every 3 hours PRN Severe Pain (7 - 10)      Assessment  76M current smoker h/o TB (not treated), HLD, BPH and p/w 2x1.5cm ISIDRO nodule now s/p L robotic-assisted VATS ISIDRO wedge resection [frozen c/w NSCLC], completion ISIDRO lobectomy w/MLND on 8/11/21.     Post op course c/b persistent AL, failed WS trial 8/13 now back to sxn. Also, pt with urinary retention s/p straight cath x2 now voiding.     8/15 Intermittent 1 chamber AL noted, CT to Sxn.      PLAN  Neuro: Pain management, PRN PO oxy alternating with Tylenol  Pulm: Encourage coughing, deep breathing and use of incentive spirometry. Wean off supplemental oxygen as able. Daily CXR. Cont with symbicort and Chest PT. Evaluate need for home O2. Patient currenlty using 3L nc  Cardio: Monitor telemetry/alarms. BP soft, systolic 100-110 but asymptomatic. HR stable. Cont with lipitor for HLD  GI: Tolerating diet. Continue stool softeners. Soft diet  Renal: monitor urine output, supplement electrolytes as needed. Voiding. Hx of BPH cont with flomax  Vasc: Heparin SC/SCDs for DVT prophylaxis  Heme: Stable H/H.   ID: Off antibiotics. Stable.  Therapy: OOB/ambulate  Tubes: Monitor Chest tube output and AL   Disposition: Aim to D/C to home once AL resolves and CT is removed.  Discussed with Cardiothoracic Team at AM rounds.   Subjective: 77 y/o male seen in room this morning. Patient was sitting up OOBTC but was noted to be ambulating within room prior. Patient continues to become tacchycardic to 110's and SOB with movement, requires 3L NC at all times. Left CT remains in place to sxn with passive AL.   Otherwise patient in NAD, complain on "Pain to chest and back"    Vital Signs:  Vital Signs Last 24 Hrs  T(C): 36.8 (08-15-21 @ 04:34), Max: 37 (08-15-21 @ 01:37)  T(F): 98.2 (08-15-21 @ 04:34), Max: 98.6 (08-15-21 @ 01:37)  HR: 94 (08-15-21 @ 04:34) (87 - 110)  BP: 108/71 (08-15-21 @ 04:34) (97/63 - 114/67)  RR: 18 (08-15-21 @ 04:34) (17 - 18)  SpO2: 96% (08-15-21 @ 04:34) (92% - 100%) on (O2)    Pertinent Physical Exam:  Telemetry/Alarms: NSR 90s at rest, 120's with ambulation.   General: WN/WD NAD  Neurology: Awake, nonfocal, MICHAELS x 4  Eyes: Scleras clear, PERRLA/ EOMI, Gross vision intact  ENT:Gross hearing intact, grossly patent pharynx, no stridor  Neck: Neck supple, trachea midline, No JVD,   Respiratory: CTA B/L, No wheezing, rales, rhonchi  CV: RRR, S1S2, no murmurs, rubs or gallops  Abdominal: Soft, NT, ND +BS,   Extremities: No edema, + peripheral pulses  Skin: No Rashes, Hematoma, Ecchymosis  Lymphatic: No Neck, axilla, groin LAD  Psych: Oriented x 3, normal affect  Incisions: CDI  Tubes: Left with AL    Chest Tube: Left Side    Air Leak: Yes[x] / No[]    Drainage: 50cc    I&O's Summary    14 Aug 2021 07:01  -  15 Aug 2021 07:00  --------------------------------------------------------  IN: 250 mL / OUT: 50 mL / NET: 200 mL        Relevant labs, radiology and Medications reviewed      CXR: 8/15 pending official read  CT on right in place. appears unchaged from yesterday      MEDICATIONS  (STANDING):  aspirin  chewable 81 milliGRAM(s) Oral daily  atorvastatin 40 milliGRAM(s) Oral at bedtime  budesonide 160 MICROgram(s)/formoterol 4.5 MICROgram(s) Inhaler 2 Puff(s) Inhalation two times a day  heparin   Injectable 2500 Unit(s) SubCutaneous every 12 hours  lidocaine   5% Patch 1 Patch Transdermal every 24 hours  polyethylene glycol 3350 17 Gram(s) Oral daily  senna 2 Tablet(s) Oral at bedtime  tamsulosin 0.4 milliGRAM(s) Oral at bedtime    MEDICATIONS  (PRN):  metoclopramide 10 milliGRAM(s) Oral three times a day PRN nausea  naloxone Injectable 0.1 milliGRAM(s) IV Push every 3 minutes PRN For ANY of the following changes in patient status:  A. RR LESS THAN 10 breaths per minute, B. Oxygen saturation LESS THAN 90%, C. Sedation score of 6  ondansetron Injectable 4 milliGRAM(s) IV Push every 6 hours PRN Nausea  oxyCODONE    Solution 5 milliGRAM(s) Oral every 3 hours PRN Moderate Pain (4 - 6)  oxyCODONE    Solution 10 milliGRAM(s) Oral every 3 hours PRN Severe Pain (7 - 10)      Assessment  76M current smoker h/o TB (not treated), HLD, BPH and p/w 2x1.5cm ISIDRO nodule now s/p L robotic-assisted VATS ISIDRO wedge resection [frozen c/w NSCLC], completion ISIDRO lobectomy w/MLND on 8/11/21.     Post op course c/b persistent AL, failed WS trial 8/13 now back to sxn. Also, pt with urinary retention s/p straight cath x2 now voiding.     8/15 Intermittent 1 chamber AL noted, CT to Sxn.      PLAN  Neuro: Pain management, PRN PO oxy alternating with Tylenol  Pulm: Encourage coughing, deep breathing and use of incentive spirometry. Wean off supplemental oxygen as able. Daily CXR. Cont with symbicort and Chest PT. Evaluate need for home O2. Patient currenlty using 3L nc  Cardio: Monitor telemetry/alarms. BP soft, systolic 100-110 but asymptomatic. HR stable. Cont with lipitor for HLD  GI: Tolerating diet. Continue stool softeners. Soft diet  Renal: monitor urine output, supplement electrolytes as needed. not Voiding. Hx of BPH cont with flomax. Bladder scan 1200 urine, place pardo.  Vasc: Heparin SC/SCDs for DVT prophylaxis  Heme: Stable H/H.   ID: Off antibiotics. Stable.  Therapy: OOB/ambulate  Tubes: Monitor Chest tube output and AL   Disposition: Aim to D/C to home once AL resolves and CT is removed.  Discussed with Cardiothoracic Team at AM rounds.

## 2021-08-15 NOTE — PROVIDER CONTACT NOTE (OTHER) - RECOMMENDATIONS
Encouraged pt and son multiple times to have pardo inserted and explained the risk of not having a pardo at this time

## 2021-08-16 PROCEDURE — 71045 X-RAY EXAM CHEST 1 VIEW: CPT | Mod: 26,76

## 2021-08-16 RX ORDER — ACETAMINOPHEN 500 MG
650 TABLET ORAL EVERY 6 HOURS
Refills: 0 | Status: DISCONTINUED | OUTPATIENT
Start: 2021-08-16 | End: 2021-08-17

## 2021-08-16 RX ADMIN — BUDESONIDE AND FORMOTEROL FUMARATE DIHYDRATE 2 PUFF(S): 160; 4.5 AEROSOL RESPIRATORY (INHALATION) at 21:26

## 2021-08-16 RX ADMIN — LIDOCAINE 1 PATCH: 4 CREAM TOPICAL at 20:06

## 2021-08-16 RX ADMIN — TAMSULOSIN HYDROCHLORIDE 0.4 MILLIGRAM(S): 0.4 CAPSULE ORAL at 21:26

## 2021-08-16 RX ADMIN — HEPARIN SODIUM 2500 UNIT(S): 5000 INJECTION INTRAVENOUS; SUBCUTANEOUS at 21:24

## 2021-08-16 RX ADMIN — Medication 650 MILLIGRAM(S): at 22:41

## 2021-08-16 RX ADMIN — LIDOCAINE 1 PATCH: 4 CREAM TOPICAL at 12:00

## 2021-08-16 RX ADMIN — SENNA PLUS 2 TABLET(S): 8.6 TABLET ORAL at 21:26

## 2021-08-16 RX ADMIN — Medication 81 MILLIGRAM(S): at 11:51

## 2021-08-16 RX ADMIN — HEPARIN SODIUM 2500 UNIT(S): 5000 INJECTION INTRAVENOUS; SUBCUTANEOUS at 09:46

## 2021-08-16 RX ADMIN — LIDOCAINE 1 PATCH: 4 CREAM TOPICAL at 00:00

## 2021-08-16 RX ADMIN — Medication 650 MILLIGRAM(S): at 23:15

## 2021-08-16 RX ADMIN — ATORVASTATIN CALCIUM 40 MILLIGRAM(S): 80 TABLET, FILM COATED ORAL at 21:25

## 2021-08-16 NOTE — PROGRESS NOTE ADULT - PROVIDER SPECIALTY LIST ADULT
CT Surgery
Critical Care
Pain Medicine
Thoracic Surgery
Thoracic Surgery
Anesthesia
Critical Care
Pain Medicine
Thoracic Surgery

## 2021-08-16 NOTE — PROGRESS NOTE ADULT - SUBJECTIVE AND OBJECTIVE BOX
Subjective: Translation done by Dr. Moore at bedside. Pt c/o urinary retention from yestreday, now with Basilio. Pt instructed he will have to go home and Basilio and see a urologist as an outpt. Pt c.o some pain at CT site. No SOB. OOB w assist. Weaning off O2.     Vital Signs:  Vital Signs Last 24 Hrs  T(C): 36.8 (08-16-21 @ 13:36), Max: 36.9 (08-16-21 @ 01:10)  T(F): 98.2 (08-16-21 @ 13:36), Max: 98.5 (08-16-21 @ 01:10)  HR: 115 (08-16-21 @ 13:36) (86 - 115)  BP: 107/62 (08-16-21 @ 13:36) (100/52 - 122/72)  RR: 18 (08-16-21 @ 13:36) (18 - 18)  SpO2: 95% (08-16-21 @ 13:36) (95% - 100%) on (O2)    Telemetry/Alarms:  General: WN/WD NAD  Neurology: Awake, nonfocal, MICHAELS x 4  Eyes: Scleras clear, PERRLA/ EOMI, Gross vision intact  ENT:Gross hearing intact, grossly patent pharynx, no stridor  Neck: Neck supple, trachea midline, No JVD,   Respiratory: Dec BS to left side. Sml SQ air  CV: RRR, S1S2, no murmurs, rubs or gallops  Abdominal: Soft, NT, ND +BS, + BM 2 days ago  Extremities: No edema, + peripheral pulses  Skin: No Rashes, Hematoma, Ecchymosis  Lymphatic: No Neck, axilla, groin LAD  Psych: Oriented x 3, normal affect  Incisions: VATS c/d/i.   Tubes: left CT-40cc/24hrs on sxn with expiratory air leak. CT to waterseal this am  Relevant labs, radiology and Medications reviewed    CXR this am no obvious ptx.         MEDICATIONS  (STANDING):  aspirin  chewable 81 milliGRAM(s) Oral daily  atorvastatin 40 milliGRAM(s) Oral at bedtime  budesonide 160 MICROgram(s)/formoterol 4.5 MICROgram(s) Inhaler 2 Puff(s) Inhalation two times a day  heparin   Injectable 2500 Unit(s) SubCutaneous every 12 hours  polyethylene glycol 3350 17 Gram(s) Oral daily  senna 2 Tablet(s) Oral at bedtime  tamsulosin 0.4 milliGRAM(s) Oral at bedtime    MEDICATIONS  (PRN):  metoclopramide 10 milliGRAM(s) Oral three times a day PRN nausea  naloxone Injectable 0.1 milliGRAM(s) IV Push every 3 minutes PRN For ANY of the following changes in patient status:  A. RR LESS THAN 10 breaths per minute, B. Oxygen saturation LESS THAN 90%, C. Sedation score of 6  ondansetron Injectable 4 milliGRAM(s) IV Push every 6 hours PRN Nausea  oxyCODONE    Solution 5 milliGRAM(s) Oral every 3 hours PRN Moderate Pain (4 - 6)  oxyCODONE    Solution 10 milliGRAM(s) Oral every 3 hours PRN Severe Pain (7 - 10)    Pertinent Physical Exam  I&O's Summary    15 Aug 2021 07:01  -  16 Aug 2021 07:00  --------------------------------------------------------  IN: 60 mL / OUT: 1940 mL / NET: -1880 mL    16 Aug 2021 07:01  -  16 Aug 2021 13:45  --------------------------------------------------------  IN: 0 mL / OUT: 258 mL / NET: -258 mL        Assessment  76y Male  w/ PAST MEDICAL & SURGICAL HISTORY:  BPH (benign prostatic hyperplasia)    Dyslipidemia    H/O left knee surgery  fracture repair; 2014    admitted with complaints of Patient is a 76y old  Male who presents with a chief complaint of s/p ISIDRO wedge resection, completion lobectomy (12 Aug 2021 12:48)  Assessment  76M current smoker h/o TB (not treated), HLD, BPH and p/w 2x1.5cm ISIDOR nodule now s/p L robotic-assisted VATS ISIDRO wedge resection [frozen c/w NSCLC], completion ISIDRO lobectomy w/MLND on 8/11/21.     Post op course c/b persistent AL, failed WS trial 8/13 now back to sxn. Also, pt with urinary retention s/p straight cath x2 now voiding.     8/15 Intermittent 1 chamber AL noted, CT to Sxn. Pt with urinary retention again. Basilio placed for 1.2 L. On Flomax   8/16- Trial of waterseal again.     PLAN  Neuro: Pain management  Pulm: Encourage coughing, deep breathing and use of incentive spirometry. Wean off supplemental oxygen as able. Daily CXR. Fu CXR after CT to waterseal  Cardio: Monitor telemetry/alarms  GI: Tolerating diet. Continue stool softeners.  Renal: monitor urine output, supplement electrolytes as needed  Vasc: Heparin SC/SCDs for DVT prophylaxis  Heme: Stable H/H. .   ID: Off antibiotics. Stable.  Therapy: OOB/ambulate  Tubes: Monitor Chest tube output and air leak[facility]   Disposition: Aim to D/C to home once CT removed.   Discussed with Cardiothoracic Team at AM rounds.

## 2021-08-17 ENCOUNTER — TRANSCRIPTION ENCOUNTER (OUTPATIENT)
Age: 76
End: 2021-08-17

## 2021-08-17 VITALS
TEMPERATURE: 98 F | RESPIRATION RATE: 117 BRPM | OXYGEN SATURATION: 95 % | DIASTOLIC BLOOD PRESSURE: 66 MMHG | SYSTOLIC BLOOD PRESSURE: 106 MMHG | HEART RATE: 124 BPM

## 2021-08-17 PROCEDURE — 71045 X-RAY EXAM CHEST 1 VIEW: CPT | Mod: 26

## 2021-08-17 RX ORDER — OXYCODONE HYDROCHLORIDE 5 MG/1
1 TABLET ORAL
Qty: 30 | Refills: 0
Start: 2021-08-17 | End: 2021-08-21

## 2021-08-17 RX ORDER — ACETAMINOPHEN 500 MG
2 TABLET ORAL
Qty: 0 | Refills: 0 | DISCHARGE

## 2021-08-17 RX ORDER — POLYETHYLENE GLYCOL 3350 17 G/17G
17 POWDER, FOR SOLUTION ORAL
Qty: 0 | Refills: 0 | DISCHARGE
Start: 2021-08-17

## 2021-08-17 RX ORDER — SENNA PLUS 8.6 MG/1
2 TABLET ORAL
Qty: 0 | Refills: 0 | DISCHARGE
Start: 2021-08-17

## 2021-08-17 RX ADMIN — Medication 650 MILLIGRAM(S): at 16:11

## 2021-08-17 RX ADMIN — Medication 81 MILLIGRAM(S): at 12:10

## 2021-08-17 RX ADMIN — HEPARIN SODIUM 2500 UNIT(S): 5000 INJECTION INTRAVENOUS; SUBCUTANEOUS at 08:56

## 2021-08-17 RX ADMIN — LIDOCAINE 1 PATCH: 4 CREAM TOPICAL at 00:29

## 2021-08-17 NOTE — DISCHARGE NOTE PROVIDER - NSDCMRMEDTOKEN_GEN_ALL_CORE_FT
aspirin 81 mg oral tablet: 1 tab(s) orally once a day; last dose 08/03  Crestor 10 mg oral tablet: 1 tab(s) orally once a day  Flomax 0.4 mg oral capsule: 1 cap(s) orally once a day  Symbicort 160 mcg-4.5 mcg/inh inhalation aerosol: 2 puff(s) inhaled 2 times a day, As Needed  Vitamin D3 1250 mcg (50,000 intl units) oral capsule: 1 cap(s) orally once a week   aspirin 81 mg oral tablet: 1 tab(s) orally once a day; last dose 08/03  chest xray PA and lateral: s/p ISIDRO lobectomy  Crestor 10 mg oral tablet: 1 tab(s) orally once a day  Flomax 0.4 mg oral capsule: 1 cap(s) orally once a day  oxyCODONE 5 mg oral tablet: 1-2  tab(s) orally every 4 hours, As Needed -for severe pain MDD:6  polyethylene glycol 3350 oral powder for reconstitution: 17 gram(s) orally once a day  senna oral tablet: 2 tab(s) orally once a day (at bedtime)  Symbicort 160 mcg-4.5 mcg/inh inhalation aerosol: 2 puff(s) inhaled 2 times a day, As Needed  Tylenol 325 mg oral tablet: 2 tab(s) orally every 4 hours as needed for pain control  Vitamin D3 1250 mcg (50,000 intl units) oral capsule: 1 cap(s) orally once a week

## 2021-08-17 NOTE — DISCHARGE NOTE PROVIDER - NSDCCPCAREPLAN_GEN_ALL_CORE_FT
PRINCIPAL DISCHARGE DIAGNOSIS  Diagnosis: Lung nodules  Assessment and Plan of Treatment: ISIDRO one increasing in size

## 2021-08-17 NOTE — DISCHARGE NOTE PROVIDER - CARE PROVIDERS DIRECT ADDRESSES
[email or web address]

## 2021-08-17 NOTE — DISCHARGE NOTE PROVIDER - NSDCFUADDINST_GEN_ALL_CORE_FT
See a urologist; take care of the Basilio leg bag- drain it regularly  If you have any difficulty breathing, fevers, pus form the wound, or increased redness, call Dr Moore

## 2021-08-17 NOTE — DISCHARGE NOTE PROVIDER - NSDCFUADDAPPT_GEN_ALL_CORE_FT
See Dr Moore in 2 weeks- call for an appointment and bring a new chest X-ray with you when you come.     Follow up with a Urologist See Dr Moore in 2 weeks  call for an appointment and bring a new chest X-ray with you when you come.       Follow up with a Urologist - call Renae at Dr Moore's office to help you make the appointment  [phone] See Dr Moore in 2 weeks  call for an appointment and bring a new chest X-ray with you when you come.       Follow up with a Urologist   Dr Colby Gordon  call for appointment

## 2021-08-17 NOTE — DISCHARGE NOTE NURSING/CASE MANAGEMENT/SOCIAL WORK - PATIENT PORTAL LINK FT
You can access the FollowMyHealth Patient Portal offered by [facility] by registering at the following website: [email or web address]. By joining [employer]’s FollowMyHealth portal, you will also be able to view your health information using other applications (apps) compatible with our system.

## 2021-08-17 NOTE — DISCHARGE NOTE PROVIDER - CARE PROVIDER_API CALL
Remigio Moore (MD)  Surgery; Thoracic Surgery  [address] [place] [address]  Phone: [phone]  Fax: [phone]  Established Patient  Follow Up Time:    Remigio Moore)  Surgery; Thoracic Surgery  [address] [place] [address], [place] [address]  Phone: [phone]  Fax: [phone]  Established Patient  Follow Up Time:     Colby Gordon)  Urology  [address] [place] [address], [address]  [place] [address]  Phone: [phone]  Fax: [phone]  Follow Up Time:

## 2021-08-17 NOTE — DISCHARGE NOTE PROVIDER - PROVIDER TOKENS
PROVIDER:[TOKEN:[[id number]],ESTABLISHEDPATIENT:[T]] PROVIDER:[TOKEN:[[id number]],ESTABLISHEDPATIENT:[T]],PROVIDER:[TOKEN:[[id number]]]

## 2021-08-17 NOTE — DISCHARGE NOTE NURSING/CASE MANAGEMENT/SOCIAL WORK - NSDCFUADDAPPT_GEN_ALL_CORE_FT
See Dr Moore in 2 weeks  call for an appointment and bring a new chest X-ray with you when you come.       Follow up with a Urologist - call Renae at Dr Moore's office to help you make the appointment  [phone]

## 2021-08-18 RX ORDER — TAMSULOSIN HYDROCHLORIDE 0.4 MG/1
1 CAPSULE ORAL
Qty: 0 | Refills: 0 | DISCHARGE

## 2021-08-18 RX ORDER — TAMSULOSIN HYDROCHLORIDE 0.4 MG/1
1 CAPSULE ORAL
Qty: 30 | Refills: 0
Start: 2021-08-18 | End: 2021-09-16

## 2021-08-20 ENCOUNTER — APPOINTMENT (OUTPATIENT)
Dept: UROLOGY | Facility: CLINIC | Age: 76
End: 2021-08-20
Payer: MEDICARE

## 2021-08-20 VITALS
DIASTOLIC BLOOD PRESSURE: 66 MMHG | WEIGHT: 109 LBS | RESPIRATION RATE: 16 BRPM | HEART RATE: 113 BPM | TEMPERATURE: 98.2 F | SYSTOLIC BLOOD PRESSURE: 95 MMHG | BODY MASS INDEX: 19.31 KG/M2 | OXYGEN SATURATION: 94 % | HEIGHT: 63 IN

## 2021-08-20 DIAGNOSIS — R33.9 RETENTION OF URINE, UNSPECIFIED: ICD-10-CM

## 2021-08-20 LAB — SURGICAL PATHOLOGY STUDY: SIGNIFICANT CHANGE UP

## 2021-08-20 PROCEDURE — 51702 INSERT TEMP BLADDER CATH: CPT

## 2021-08-20 PROCEDURE — 99204 OFFICE O/P NEW MOD 45 MIN: CPT | Mod: 25

## 2021-08-26 ENCOUNTER — APPOINTMENT (OUTPATIENT)
Dept: THORACIC SURGERY | Facility: CLINIC | Age: 76
End: 2021-08-26
Payer: MEDICARE

## 2021-08-26 VITALS
WEIGHT: 111 LBS | DIASTOLIC BLOOD PRESSURE: 71 MMHG | BODY MASS INDEX: 19.66 KG/M2 | SYSTOLIC BLOOD PRESSURE: 107 MMHG | OXYGEN SATURATION: 95 % | HEART RATE: 118 BPM | TEMPERATURE: 97.6 F | RESPIRATION RATE: 16 BRPM

## 2021-08-26 PROCEDURE — 99024 POSTOP FOLLOW-UP VISIT: CPT

## 2021-08-27 ENCOUNTER — APPOINTMENT (OUTPATIENT)
Dept: UROLOGY | Facility: CLINIC | Age: 76
End: 2021-08-27
Payer: MEDICARE

## 2021-08-27 PROCEDURE — 99213 OFFICE O/P EST LOW 20 MIN: CPT

## 2021-08-27 PROCEDURE — 51798 US URINE CAPACITY MEASURE: CPT

## 2021-08-27 RX ORDER — AMOXICILLIN 500 MG/1
500 CAPSULE ORAL
Refills: 0 | Status: DISCONTINUED | COMMUNITY

## 2021-08-27 RX ORDER — CIPROFLOXACIN HYDROCHLORIDE 500 MG/1
500 TABLET, FILM COATED ORAL
Refills: 0 | Status: COMPLETED | OUTPATIENT
Start: 2021-08-27

## 2021-09-03 ENCOUNTER — APPOINTMENT (OUTPATIENT)
Dept: UROLOGY | Facility: CLINIC | Age: 76
End: 2021-09-03
Payer: MEDICARE

## 2021-09-03 VITALS
HEART RATE: 113 BPM | BODY MASS INDEX: 20.55 KG/M2 | RESPIRATION RATE: 16 BRPM | TEMPERATURE: 97.6 F | OXYGEN SATURATION: 95 % | DIASTOLIC BLOOD PRESSURE: 68 MMHG | WEIGHT: 116 LBS | SYSTOLIC BLOOD PRESSURE: 108 MMHG

## 2021-09-03 DIAGNOSIS — N40.1 BENIGN PROSTATIC HYPERPLASIA WITH LOWER URINARY TRACT SYMPMS: ICD-10-CM

## 2021-09-03 DIAGNOSIS — R33.8 BENIGN PROSTATIC HYPERPLASIA WITH LOWER URINARY TRACT SYMPMS: ICD-10-CM

## 2021-09-03 PROCEDURE — 99213 OFFICE O/P EST LOW 20 MIN: CPT

## 2021-09-03 PROCEDURE — 51798 US URINE CAPACITY MEASURE: CPT

## 2021-09-03 NOTE — LETTER BODY
[FreeTextEntry1] : Soha Herman DO \par [address] [facility] [address]\par [place] [address]\par [phone]\par \par Dear Dr. Sutton \par \par Reason for Visit: Urinary retention. BPH.\par \par This is a 76 year-old gentleman with BPH presenting with postoperative urinary retention. The patient developed urinary mention after lung surgery. A Basilio catheter was placed at the hospital. The patient failed his previous voiding trial. He passed his recent voiding trial.  He returns today for follow up. Since he was last seen, the patient has been taking Flomax and Proscar regularly as directed. He is voiding well while on medical therapy. He denies any urinary retention. Patient denies any gross hematuria or dysuria or urinary incontinence. He denies any changes in health. All other review of systems are negative. Past medical history, family history and social history were inquired and were noncontributory to current condition. Medications and allergies were reviewed. He has no known allergies to medication. \par \par On examination, the patient is a older appearing gentleman in no acute distress. He is alert and oriented follows commands. He has normal mood and affect. He is normocephalic. Oral no thrush. Neck is supple. Respirations are unlabored. His abdomen is soft and nontender. Liver is nonpalpable. Bladder is nonpalpable. No CVA tenderness. Neurologically he is grossly intact. No peripheral edema. Skin without gross abnormality. He has normal male external genitalia. Normal meatus. Bilateral testes are descended intrascrotally and normal to palpation. On rectal examination, there is normal sphincter tone. The prostate is clinically benign without focal induration or nodularity.\par \par Post-void residual on bladder scan today was 28  cc. \par  \par Assessment: Urinary retention, resolved. BPH.\par \par I counseled the patient. His PVR is 28 cc. He is doing well. I recommended the patient continue taking Flomax and Proscar for his BPH. I renewed the patient's prescription for Flomax and Proscar today. I encouraged the patient to continue medications regularly as directed. Patient understands that if he develops gross hematuria or any urinary discomfort, he will contact me for further evaluation. Risks and alternatives were discussed. I answered the patient questions. The patient will follow-up as directed and will contact me with any questions or concerns. Thank you for the opportunity to participate in the care of Mr. CASAREZ. I will keep you updated on his progress. \par \par Plan: Continue Flomax and Proscar. Follow up in 6 months.

## 2021-09-03 NOTE — ADDENDUM
[FreeTextEntry1] : Entered by Louisa Del Cid, acting as scribe for Dr. Colby Gordon.\par \par The documentation recorded by the scribe accurately reflects the service I personally performed and the decisions made by me.

## 2021-09-03 NOTE — HISTORY OF PRESENT ILLNESS
[FreeTextEntry1] : Please refer to URO Consult note \par \par fu bph \par recent retention \par passed voiding trial \par voiding well on proscar flomax \par continue meds \par pvr 28 cc \par come back in 6 months \par

## 2021-09-05 NOTE — HISTORY OF PRESENT ILLNESS
[FreeTextEntry1] : Please refer to URO Consult note \par \par failed voiding trial last time \par repeat voiding trial

## 2021-09-05 NOTE — LETTER BODY
[FreeTextEntry1] : Soha Sutton DO  \par [address] [facility] [address]\par [place] [address]\par [phone]\par \par Dear Dr. Sutton \par \par Reason for Visit: Urinary retention \par \par This is a 76 year-old gentleman with BPH presenting with postoperative urinary retention. The patient developed urinary mention after lung surgery. A Basilio catheter was placed at the hospital . Patient denies any gross hematuria or dysuria or urinary incontinence. The patient has a history of BPH and is currently on Flomax. The patient failed his previous voiding trial.  He returns today for repeat voiding trial.  Since the patient was last seen, he denies any changes in health. All other review of systems are negative. Past medical history, family history and social history were inquired and were noncontributory to current condition.  Medications and allergies were reviewed. He has no known allergies to medication. \par \par On examination, the patient is a healthy-appearing gentleman in no acute distress. He is alert and oriented follows commands. He has normal mood and affect. He is normocephalic. Oral no thrush. Neck is supple. Respirations are unlabored. His abdomen is soft and nontender. Liver is nonpalpable. Bladder is nonpalpable. No CVA tenderness. Neurologically he is grossly intact. No peripheral edema. Skin without gross abnormality. He has normal male external genitalia. Normal meatus. Bilateral testes are descended intrascrotally and normal to palpation. On rectal examination, there is normal sphincter tone. The prostate is clinically benign without focal induration or nodularity.\par \par Patient was given a voiding trial today. The patient's bladder was filled with 300 cc of water. Patient was able to void spontaneously.\par \par Post-void residual on bladder scan today was 120cc.\par \par  \par Assessment: Urinary retention, resolved.  BPH.\par \par I counseled the patient. His PVR is 120.  He passed his voiding trial. He was able to void spontaneously. I recommended the patient follow up in 1 week for post void residual evaluation.. Patient understands that if he develops gross hematuria or any urinary discomfort, he will contact me for further evaluation.   Risks and alternatives were discussed. I answered the patient questions. The patient will follow-up as directed and will contact me with any questions or concerns. Thank you for the opportunity to participate in the care of this patient. I'll keep you updated on his progress.\par \par Plan: Follow up in 1 week.

## 2021-09-05 NOTE — HISTORY OF PRESENT ILLNESS
[FreeTextEntry1] : Patient with urinary retention after lung surgery.  He has history of BPH on Flomax.\par \par Patient was given a voiding trial today. The patient's bladder was filled with 300 cc of water. Patient was unable to void spontaneously.

## 2021-09-09 ENCOUNTER — APPOINTMENT (OUTPATIENT)
Dept: THORACIC SURGERY | Facility: CLINIC | Age: 76
End: 2021-09-09
Payer: MEDICARE

## 2021-09-09 VITALS
SYSTOLIC BLOOD PRESSURE: 98 MMHG | WEIGHT: 117 LBS | RESPIRATION RATE: 16 BRPM | TEMPERATURE: 98 F | HEART RATE: 84 BPM | OXYGEN SATURATION: 94 % | DIASTOLIC BLOOD PRESSURE: 62 MMHG | BODY MASS INDEX: 20.73 KG/M2

## 2021-09-09 PROCEDURE — 99024 POSTOP FOLLOW-UP VISIT: CPT

## 2021-11-18 ENCOUNTER — APPOINTMENT (OUTPATIENT)
Dept: THORACIC SURGERY | Facility: CLINIC | Age: 76
End: 2021-11-18
Payer: MEDICARE

## 2021-11-18 VITALS
BODY MASS INDEX: 21.08 KG/M2 | TEMPERATURE: 98.1 F | HEART RATE: 119 BPM | SYSTOLIC BLOOD PRESSURE: 120 MMHG | DIASTOLIC BLOOD PRESSURE: 75 MMHG | OXYGEN SATURATION: 97 % | RESPIRATION RATE: 16 BRPM | WEIGHT: 119 LBS

## 2021-11-18 PROCEDURE — 99214 OFFICE O/P EST MOD 30 MIN: CPT

## 2021-11-19 RX ORDER — OMEGA-3-ACID ETHYL ESTERS CAPSULES 1 G/1
1 CAPSULE, LIQUID FILLED ORAL
Qty: 120 | Refills: 0 | Status: ACTIVE | COMMUNITY
Start: 2021-09-24

## 2021-11-19 RX ORDER — OLOPATADINE HYDROCHLORIDE 2 MG/ML
0.2 SOLUTION OPHTHALMIC
Qty: 5 | Refills: 0 | Status: ACTIVE | COMMUNITY
Start: 2021-05-01

## 2021-11-19 RX ORDER — NAFTIFINE HYDROCHLORIDE 20 MG/G
2 CREAM TOPICAL
Qty: 60 | Refills: 0 | Status: ACTIVE | COMMUNITY
Start: 2021-04-30

## 2021-11-19 RX ORDER — TIOTROPIUM BROMIDE AND OLODATEROL 3.124; 2.736 UG/1; UG/1
2.5-2.5 SPRAY, METERED RESPIRATORY (INHALATION)
Qty: 4 | Refills: 0 | Status: ACTIVE | COMMUNITY
Start: 2021-07-24

## 2021-11-19 RX ORDER — BUDESONIDE AND FORMOTEROL FUMARATE DIHYDRATE 160; 4.5 UG/1; UG/1
160-4.5 AEROSOL RESPIRATORY (INHALATION)
Qty: 10 | Refills: 0 | Status: ACTIVE | COMMUNITY
Start: 2021-05-01

## 2021-11-19 RX ORDER — DICLOFENAC EPOLAMINE 0.01 G/1
1.3 SYSTEM TOPICAL
Qty: 60 | Refills: 0 | Status: ACTIVE | COMMUNITY
Start: 2021-09-10

## 2021-11-22 NOTE — ASSESSMENT
[FreeTextEntry1] : Mr. PACO CASAREZ, 76 year old male, current smoker, w/ hx of HLD, +T.B. in the past (not treated), and Lung CA.\par \par Now 3 months s/p Lt VATS Robotic-assisted, ISIDRO wedge rxn, LULobectomy, MLND on 8/11/21. 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 +TTF-1.\par post-op urinary retention s/p Basilio and a persistent air leak, patient was discharged home with Mini Atirum. \par \par Patient is s/p chest tube removal on 09/09/2021. \par \par I have reviewed the patient's medical records and diagnostic images at time of this office consultation and have made the following recommendation:\par 1. RTC in 3mo with CT Chest w/o contrast\par \par \par I personally performed the services described in the documentation, reviewed the documentation recorded by the scribe in my presence and it accurately and completely records my words and actions.\par \par I, Shukri Fabian, GILLES, am scribing for and the presence of KENDELL Vaz, the following sections HISTORY OF PRESENT ILLNESS, PAST MEDICAL/FAMILY/SOCIAL HISTORY; REVIEW OF SYSTEMS; VITAL SIGNS; PHYSICAL EXAM; DISPOSITION.\par \par

## 2021-11-22 NOTE — CONSULT LETTER
[Dear  ___] : Dear  [unfilled], [Consult Letter:] : I had the pleasure of evaluating your patient, [unfilled]. [( Thank you for referring [unfilled] for consultation for _____ )] : Thank you for referring [unfilled] for consultation for [unfilled] [Please see my note below.] : Please see my note below. [Consult Closing:] : Thank you very much for allowing me to participate in the care of this patient.  If you have any questions, please do not hesitate to contact me. [Sincerely,] : Sincerely, [DrLa  ___] : Dr. PALOMINO [FreeTextEntry2] : Dr. Osmar Sutton (PCP/Referring)\par Dr. Nicholas Klein (pulm) [FreeTextEntry3] : Remigio Moore MD, MPH \par System Director of Thoracic Surgery \par Director of Comprehensive Lung and Foregut [place] \par Professor Cardiovascular & Thoracic Surgery  \par [facility]\par \par [facility]\par [address]\par Oncology [address]\[place] [address]\par Tel: [phone]\par Fax: [phone]\par

## 2021-11-22 NOTE — HISTORY OF PRESENT ILLNESS
[FreeTextEntry1] : Mr. PACO CASAREZ, 76 year old male, current smoker, w/ hx of HLD, +T.B. in the past (not treated), who presented to PCP Dr. Osmar Sutton for routine physical exam, sent for lung CA screening due to smoking hx. First CT scan in Oct 2020 which showed lung nodule.\par \par Repeat CT Chest on 6/26/21:\par - an increasing size 1.9 x 1.4 x 1cm ISIDRO nodule (4:55; previously 1.7 x 1.3 x 0.8cm)\par \par PFTs on 7/24/21: %, FEV1 78%, DLCO 53%.\par \par PET/CT on 7/31/21:\par - a 1.9cm SUV=8.7 irregular ISIDRO nodule near the aortic arch (image 46)\par - moderate emphysema\par - 3.8cm Rt hepatic cyst; 1.8cm Lt renal cyst\par \par Now 3 months s/p Lt VATS Robotic-assisted, ISIDRO wedge rxn, LULobectomy, MLND on 8/11/21. 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 +TTF-1.\par post-op urinary retention s/p Basilio and a persistent air leak, patient was discharged home with Mini Atirum. \par \par Patient is s/p chest tube removal on 09/09/2021. \par \par CXR today -- clear, no PTX.\par  \par Patient is here today for a follow up. Admits to SOB on exertion and cough.\par

## 2021-12-21 ENCOUNTER — NON-APPOINTMENT (OUTPATIENT)
Age: 76
End: 2021-12-21

## 2022-02-24 ENCOUNTER — APPOINTMENT (OUTPATIENT)
Dept: THORACIC SURGERY | Facility: CLINIC | Age: 77
End: 2022-02-24
Payer: MEDICARE

## 2022-02-24 VITALS
OXYGEN SATURATION: 95 % | TEMPERATURE: 98 F | BODY MASS INDEX: 22.32 KG/M2 | DIASTOLIC BLOOD PRESSURE: 65 MMHG | RESPIRATION RATE: 16 BRPM | HEART RATE: 96 BPM | WEIGHT: 126 LBS | SYSTOLIC BLOOD PRESSURE: 102 MMHG

## 2022-02-24 PROCEDURE — 99214 OFFICE O/P EST MOD 30 MIN: CPT

## 2022-02-25 NOTE — HISTORY OF PRESENT ILLNESS
[FreeTextEntry1] : Mr. PACO CASAREZ, 76 year old male, current smoker, w/ hx of HLD, +T.B. in the past (not treated), who presented to PCP Dr. Osmar Sutton for routine physical exam, sent for lung CA screening due to smoking hx. First CT scan in Oct 2020 which showed lung nodule.\par \par Repeat CT Chest on 6/26/21:\par - an increasing size 1.9 x 1.4 x 1cm ISIDRO nodule (4:55; previously 1.7 x 1.3 x 0.8cm)\par \par PFTs on 7/24/21: %, FEV1 78%, DLCO 53%.\par \par PET/CT on 7/31/21:\par - a 1.9cm SUV=8.7 irregular ISIDRO nodule near the aortic arch (image 46)\par - moderate emphysema\par - 3.8cm Rt hepatic cyst; 1.8cm Lt renal cyst\par \par Now 6 months s/p Lt VATS Robotic-assisted, ISIDRO wedge rxn, LULobectomy, MLND on 8/11/21. 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 +TTF-1.\par post-op urinary retention s/p Basilio and a persistent air leak, patient was discharged home with Mini Atirum. \par \par Patient is s/p chest tube removal on 09/09/2021. \par \par CT Chest on 2/21/22:\par - post-op changes\par - stable 3 mm nodule in RLL posteriorly (4:136)\par - moderate emphysematous changes and bilaterally mild patchy scarring\par \par Pt presents today for follow up. Admits to SOB on exertion and on/off throat irritation.\par \par

## 2022-02-25 NOTE — CONSULT LETTER
[Dear  ___] : Dear  [unfilled], [Consult Letter:] : I had the pleasure of evaluating your patient, [unfilled]. [( Thank you for referring [unfilled] for consultation for _____ )] : Thank you for referring [unfilled] for consultation for [unfilled] [Please see my note below.] : Please see my note below. [Consult Closing:] : Thank you very much for allowing me to participate in the care of this patient.  If you have any questions, please do not hesitate to contact me. [Sincerely,] : Sincerely, [DrLa  ___] : Dr. PALOMINO [FreeTextEntry2] : Dr. Osmar Sutton (PCP/Referring)\par Dr. Nicholas Klein (pulm) [FreeTextEntry3] : Remigio Moore MD, MPH \par System Director of Thoracic Surgery \par Director of Comprehensive Lung and Foregut [place] \par Professor Cardiovascular & Thoracic Surgery  \par [facility]\par \par [facility]\par [address]\par Oncology [address]\[place] [address]\par Tel: [phone]\par Fax: [phone]\par

## 2022-02-25 NOTE — ASSESSMENT
[FreeTextEntry1] : Mr. PACO CASAREZ, 76 year old male, current smoker, w/ hx of HLD, +T.B. in the past (not treated), and Lung CA.\par \par Now 6 months s/p Lt VATS Robotic-assisted, ISIDRO wedge rxn, LULobectomy, MLND on 8/11/21. 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 +TTF-1.\par post-op urinary retention s/p Basilio and a persistent air leak, patient was discharged home with Mini Atirum. \par \par CT Chest on 2/21/22:\par - post-op changes\par - stable 3 mm nodule in RLL posteriorly (4:136)\par - moderate emphysematous changes and bilaterally mild patchy scarring\par \par I have reviewed the patient's medical records and diagnostic images at time of this office consultation and have made the following recommendation:\par 1. CT scan showed no evidence of recurrence, recommended patient to return to office in 6mo w/ CT Chest w/o contrast.\par \par \par I personally performed the services described in the documentation, reviewed the documentation recorded by the scribe in my presence and it accurately and completely records my words and actions.\par \par I, Shukri Fabian NP, am scribing for and the presence of KENDELL Vaz, the following sections HISTORY OF PRESENT ILLNESS, PAST MEDICAL/FAMILY/SOCIAL HISTORY; REVIEW OF SYSTEMS; VITAL SIGNS; PHYSICAL EXAM; DISPOSITION.\par \par \par

## 2022-03-10 ENCOUNTER — NON-APPOINTMENT (OUTPATIENT)
Age: 77
End: 2022-03-10

## 2022-03-11 ENCOUNTER — APPOINTMENT (OUTPATIENT)
Dept: UROLOGY | Facility: CLINIC | Age: 77
End: 2022-03-11
Payer: MEDICARE

## 2022-03-11 VITALS
DIASTOLIC BLOOD PRESSURE: 57 MMHG | SYSTOLIC BLOOD PRESSURE: 94 MMHG | HEART RATE: 110 BPM | OXYGEN SATURATION: 95 % | WEIGHT: 124 LBS | BODY MASS INDEX: 21.97 KG/M2 | RESPIRATION RATE: 16 BRPM

## 2022-03-11 DIAGNOSIS — Z00.00 ENCOUNTER FOR GENERAL ADULT MEDICAL EXAMINATION W/OUT ABNORMAL FINDINGS: ICD-10-CM

## 2022-03-11 PROCEDURE — 99213 OFFICE O/P EST LOW 20 MIN: CPT

## 2022-03-11 NOTE — LETTER BODY
[FreeTextEntry1] : Soha Herman DO \par [address] [facility] [address]\par [place] [address]\par [phone]\par \par Dear Dr. Sutton \par \par Reason for Visit: Urinary retention. BPH.\par \par This is a 77 year-old gentleman with BPH and previous postoperative urinary retention. The patient developed urinary retention after lung surgery. He required catheterization. He passed his previous voiding trial.  The patient returns today for follow up. Since he was last seen, the patient has been taking Flomax BID and Proscar regularly as directed. He is voiding well while on medical therapy. He notes stable urinary symptoms. He denies any urinary retention. Patient denies any gross hematuria or dysuria or urinary incontinence. He denies any changes in health. All other review of systems are negative. Past medical history, family history and social history were inquired and were noncontributory to current condition. Medications and allergies were reviewed. He has no known allergies to medication. \par \par On examination, the patient is a older appearing gentleman in no acute distress. He is alert and oriented follows commands. He has normal mood and affect. He is normocephalic. Oral no thrush. Neck is supple. Respirations are unlabored. His abdomen is soft and nontender. Liver is nonpalpable. Bladder is nonpalpable. No CVA tenderness. Neurologically he is grossly intact. No peripheral edema. Skin without gross abnormality. He has normal male external genitalia. Normal meatus. Bilateral testes are descended intrascrotally and normal to palpation. On rectal examination, there is normal sphincter tone. The prostate is clinically benign without focal induration or nodularity.\par \par Assessment: Previous urinary retention. BPH.\par \par I counseled the patient. He is doing well. The patient reports stable urinary symptoms with medical therapy.  I recommended the patient continue taking Flomax BID and Proscar.  I renewed the patient's prescription for Flomax and Proscar today. I encouraged the patient to continue medications regularly as directed. He will obtain BMP and PSA today to establish baselines. He will follow up in 1 year to ensure stability. Patient understands that if he develops gross hematuria or any urinary discomfort, he will contact me for further evaluation. Risks and alternatives were discussed. I answered the patient questions. The patient will follow-up as directed and will contact me with any questions or concerns. Thank you for the opportunity to participate in the care of Mr. CASAREZ. I will keep you updated on his progress. \par \par Plan: Continue Flomax BID and Proscar. PSA. BMP. Follow up in 1 year.

## 2022-03-12 LAB
ANION GAP SERPL CALC-SCNC: 15 MMOL/L
BUN SERPL-MCNC: 20 MG/DL
CALCIUM SERPL-MCNC: 9.4 MG/DL
CHLORIDE SERPL-SCNC: 105 MMOL/L
CO2 SERPL-SCNC: 24 MMOL/L
CREAT SERPL-MCNC: 0.97 MG/DL
EGFR: 81 ML/MIN/1.73M2
GLUCOSE SERPL-MCNC: 140 MG/DL
POTASSIUM SERPL-SCNC: 3.7 MMOL/L
PSA FREE FLD-MCNC: 27 %
PSA FREE SERPL-MCNC: 0.47 NG/ML
PSA SERPL-MCNC: 1.74 NG/ML
SODIUM SERPL-SCNC: 144 MMOL/L

## 2022-03-18 ENCOUNTER — NON-APPOINTMENT (OUTPATIENT)
Age: 77
End: 2022-03-18

## 2022-09-01 ENCOUNTER — APPOINTMENT (OUTPATIENT)
Dept: THORACIC SURGERY | Facility: CLINIC | Age: 77
End: 2022-09-01

## 2022-09-01 VITALS
DIASTOLIC BLOOD PRESSURE: 74 MMHG | WEIGHT: 131 LBS | HEART RATE: 108 BPM | TEMPERATURE: 97.9 F | OXYGEN SATURATION: 95 % | SYSTOLIC BLOOD PRESSURE: 126 MMHG | BODY MASS INDEX: 23.21 KG/M2 | RESPIRATION RATE: 18 BRPM

## 2022-09-01 PROCEDURE — 99214 OFFICE O/P EST MOD 30 MIN: CPT

## 2022-09-02 RX ORDER — CIPROFLOXACIN HYDROCHLORIDE 500 MG/1
500 TABLET, FILM COATED ORAL
Qty: 2 | Refills: 0 | Status: COMPLETED | COMMUNITY
Start: 2021-08-27 | End: 2022-09-02

## 2022-09-02 NOTE — ASSESSMENT
[FreeTextEntry1] : Mr. PACO CASAREZ, 77 year old male, current smoker, w/ hx of HLD, +T.B. in the past (not treated), who presented to PCP Dr. Osmar Sutton for routine physical exam, sent for lung CA screening due to smoking hx. First CT scan in Oct 2020 which showed lung nodule.\par \par Now 1yr s/p Lt VATS Robotic-assisted, ISIDRO wedge rxn, LULobectomy, MLND on 8/11/21. 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 +TTF-1.\par post-op urinary retention s/p Basilio and a persistent air leak, patient was discharged home with Mini Atirum. \par \par Patient is s/p chest tube removal on 09/09/2021. \par \par CT Chest on 8/19/22:\par - post-op changes\par - stable 3 mm nodule in RLL (4:132)\par - fatty liver \par \par I have reviewed the patient's medical records and diagnostic images at time of this office consultation and have made the following recommendation:\par 1. CT reviewed with pt, stable scan. RTC in 6 mons with CT Chest w/o contrast\par \par \par I personally performed the services described in the documentation, reviewed the documentation recorded by the scribe in my presence and it accurately and completely records my words and actions. \par \par I, Carol Brooks NP, am scribing for and the presence of KENDELL Vaz, the following sections HISTORY OF PRESENT ILLNESS, PAST MEDICAL/FAMILY/SOCIAL HISTORY; REVIEW OF SYSTEMS; VITAL SIGNS; PHYSICAL EXAM; DISPOSITION.\par

## 2022-09-02 NOTE — CONSULT LETTER
[Dear  ___] : Dear  [unfilled], [Consult Letter:] : I had the pleasure of evaluating your patient, [unfilled]. [( Thank you for referring [unfilled] for consultation for _____ )] : Thank you for referring [unfilled] for consultation for [unfilled] [Please see my note below.] : Please see my note below. [Consult Closing:] : Thank you very much for allowing me to participate in the care of this patient.  If you have any questions, please do not hesitate to contact me. [Sincerely,] : Sincerely, [DrLa  ___] : Dr. PALOMINO [FreeTextEntry2] : Dr. Osmar Sutton (PCP/Referring)\par Dr. Nicholas Klein (pulm) [FreeTextEntry3] : Remigio Moore MD, MPH \par System Director of Thoracic Surgery \par Director of Comprehensive Lung and Foregut [place] \par Professor Cardiovascular & Thoracic Surgery  \par [facility]\par \par [facility]\par [address]\par Oncology [address]\[place] [address]\par Tel: [phone]\par Fax: [phone]\par

## 2022-09-02 NOTE — HISTORY OF PRESENT ILLNESS
[FreeTextEntry1] : Mr. PACO CASAREZ, 77 year old male, current smoker, w/ hx of HLD, +T.B. in the past (not treated), who presented to PCP Dr. Osmar Sutton for routine physical exam, sent for lung CA screening due to smoking hx. First CT scan in Oct 2020 which showed lung nodule.\par \par Repeat CT Chest on 6/26/21:\par - an increasing size 1.9 x 1.4 x 1cm ISIDRO nodule (4:55; previously 1.7 x 1.3 x 0.8cm)\par \par PFTs on 7/24/21: %, FEV1 78%, DLCO 53%.\par \par PET/CT on 7/31/21:\par - a 1.9cm SUV=8.7 irregular ISIDRO nodule near the aortic arch (image 46)\par - moderate emphysema\par - 3.8cm Rt hepatic cyst; 1.8cm Lt renal cyst\par \par Now 1yr s/p Lt VATS Robotic-assisted, ISIDRO wedge rxn, LULobectomy, MLND on 8/11/21. 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 +TTF-1.\par post-op urinary retention s/p Basilio and a persistent air leak, patient was discharged home with Mini Atirum. \par \par Patient is s/p chest tube removal on 09/09/2021. \par \par CT Chest on 2/21/22:\par - post-op changes\par - stable 3 mm nodule in RLL posteriorly (4:136)\par - moderate emphysematous changes and bilaterally mild patchy scarring\par \par CT Chest on 8/19/22:\par - post-op changes\par - stable 3 mm nodule in RLL (4:132)\par - fatty liver \par \par Pt presents today for follow up. Pt admits to SOB on exertion,denies cough or CP. \par \par \par

## 2023-03-10 ENCOUNTER — APPOINTMENT (OUTPATIENT)
Dept: UROLOGY | Facility: CLINIC | Age: 78
End: 2023-03-10
Payer: MEDICARE

## 2023-03-10 ENCOUNTER — NON-APPOINTMENT (OUTPATIENT)
Age: 78
End: 2023-03-10

## 2023-03-10 VITALS
WEIGHT: 125 LBS | RESPIRATION RATE: 18 BRPM | TEMPERATURE: 97.1 F | DIASTOLIC BLOOD PRESSURE: 72 MMHG | BODY MASS INDEX: 22.14 KG/M2 | OXYGEN SATURATION: 96 % | SYSTOLIC BLOOD PRESSURE: 114 MMHG | HEART RATE: 109 BPM

## 2023-03-10 PROCEDURE — 99213 OFFICE O/P EST LOW 20 MIN: CPT

## 2023-03-10 NOTE — ADDENDUM
[FreeTextEntry1] : Entered by SOWMYA MILLAN, acting as scribe for Dr. Colby Gordon.\par The documentation recorded by the scribe accurately reflects the service I personally performed and the decisions made by me.

## 2023-03-10 NOTE — LETTER BODY
[FreeTextEntry1] : Soha Herman DO \par [address] [facility] [address]\par [place] [address]\par [phone]\par \par Dear Dr. Sutton \par \par Reason for Visit: Urinary retention. BPH.\par \par This is a 78 year-old gentleman with BPH and previous postoperative urinary retention. The patient developed urinary retention after lung surgery. He required catheterization. He passed his previous voiding trial. The patient returns today for follow up. Since he was last seen, the patient has been taking Flomax BID and Proscar regularly as directed. He is voiding well while on medical therapy. He notes stable urinary symptoms. He denies any urinary retention. Patient denies any gross hematuria or dysuria or urinary incontinence. He denies any changes in health. All other review of systems are negative. Past medical history, family history and social history were inquired and were noncontributory to current condition. Medications and allergies were reviewed. He has no known allergies to medication. \par \par On examination, the patient is a older appearing gentleman in no acute distress. He is alert and oriented follows commands. He has normal mood and affect. He is normocephalic. Oral no thrush. Neck is supple. Respirations are unlabored. His abdomen is soft and nontender. Liver is nonpalpable. Bladder is nonpalpable. No CVA tenderness. Neurologically he is grossly intact. No peripheral edema. Skin without gross abnormality. He has normal male external genitalia. Normal meatus. Bilateral testes are descended intrascrotally and normal to palpation. On rectal examination, there is normal sphincter tone. The prostate is clinically benign without focal induration or nodularity.\par \par His BMP in March 2022 demonstrated normal renal functions, creatinine 0.97. His PSA was 1.74, which is within normal limits.\par \par Assessment: Previous urinary retention. BPH.\par \par I counseled the patient. He is doing well. The patient reports stable urinary symptoms with medical therapy. I recommended the patient continue taking Flomax BID and Proscar. I renewed the patient's prescription for Flomax and Proscar today. I encouraged the patient to continue medications regularly as directed. He will obtain BMP and PSA today to ensure stability. He will follow up in 1 year to ensure stability. Patient understands that if he develops gross hematuria or any urinary discomfort, he will contact me for further evaluation. Risks and alternatives were discussed. I answered the patient questions. The patient will follow-up as directed and will contact me with any questions or concerns. Thank you for the opportunity to participate in the care of Mr. CASAREZ. I will keep you updated on his progress. \par \par Plan: Continue Flomax BID and Proscar. PSA. BMP. Follow up in 1 year.

## 2023-03-11 LAB
ANION GAP SERPL CALC-SCNC: 15 MMOL/L
BUN SERPL-MCNC: 18 MG/DL
CALCIUM SERPL-MCNC: 9.9 MG/DL
CHLORIDE SERPL-SCNC: 104 MMOL/L
CO2 SERPL-SCNC: 21 MMOL/L
CREAT SERPL-MCNC: 0.86 MG/DL
EGFR: 89 ML/MIN/1.73M2
GLUCOSE SERPL-MCNC: 102 MG/DL
POTASSIUM SERPL-SCNC: 4.1 MMOL/L
PSA FREE FLD-MCNC: 22 %
PSA FREE SERPL-MCNC: 0.47 NG/ML
PSA SERPL-MCNC: 2.15 NG/ML
SODIUM SERPL-SCNC: 140 MMOL/L

## 2023-03-23 ENCOUNTER — APPOINTMENT (OUTPATIENT)
Dept: THORACIC SURGERY | Facility: CLINIC | Age: 78
End: 2023-03-23
Payer: MEDICARE

## 2023-03-23 PROCEDURE — 99443: CPT

## 2023-03-24 NOTE — REASON FOR VISIT
[Home] : at home, [unfilled] , at the time of the visit. [Patient] : the patient [Follow-Up: _____] : a [unfilled] follow-up visit [Medical Office: ([facility])___] : at the medical office located in

## 2023-03-26 NOTE — HISTORY OF PRESENT ILLNESS
[FreeTextEntry1] : Mr. PACO CASAREZ, 77 year old male, current smoker, w/ hx of HLD, +T.B. in the past (not treated), who presented to PCP Dr. Osmar Sutton for routine physical exam, sent for lung CA screening due to smoking hx. First CT scan in Oct 2020 which showed lung nodule.\par \par Repeat CT Chest on 6/26/21:\par - an increasing size 1.9 x 1.4 x 1cm ISIDRO nodule (4:55; previously 1.7 x 1.3 x 0.8cm)\par \par PFTs on 7/24/21: %, FEV1 78%, DLCO 53%.\par \par PET/CT on 7/31/21:\par - a 1.9cm SUV=8.7 irregular ISIDRO nodule near the aortic arch (image 46)\par - moderate emphysema\par - 3.8cm Rt hepatic cyst; 1.8cm Lt renal cyst\par \par Now 1yr 7 mo s/p Lt VATS Robotic-assisted, ISIDRO wedge rxn, LULobectomy, MLND on 8/11/21. 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 +TTF-1.\par post-op urinary retention s/p Basilio and a persistent air leak, patient was discharged home with Mini Atirum. \par \par Patient is s/p chest tube removal on 09/09/2021. \par \par CT Chest on 2/21/22:\par - post-op changes\par - stable 3 mm nodule in RLL posteriorly (4:136)\par - moderate emphysematous changes and bilaterally mild patchy scarring\par \par CT Chest on 8/19/22:\par - post-op changes\par - stable 3 mm nodule in RLL (4:132)\par - fatty liver \par \par CT Chest on 3/6/23:\par - post op changes following prior left upper lobectomy\par - JUANA\par \par Patient is followed today via Telehealth visit. Spoke to pt today, he feels well, denies cough, sob, cp, fever. \par \par

## 2023-03-26 NOTE — ASSESSMENT
[FreeTextEntry1] : Mr. PACO CASAREZ, 78 year old male, current smoker, w/ hx of HLD, +T.B. in the past (not treated), who presented to PCP Dr. Osmar Sutton for routine physical exam, sent for lung CA screening due to smoking hx. First CT scan in Oct 2020 which showed lung nodule.\par \par Now 1yr 7 mo s/p Lt VATS Robotic-assisted, ISIDRO wedge rxn, LULobectomy, MLND on 8/11/21. 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 +TTF-1.\par post-op urinary retention s/p Baislio and a persistent air leak, patient was discharged home with Mini Atirum. \par \par Patient is s/p chest tube removal on 09/09/2021. \par \par CT Chest on 3/6/23:\par - post op changes following prior left upper lobectomy\par - JUANA\par \par I have reviewed the patient's medical records and diagnostic images at time of this office consultation and have made the following recommendation:\par 1. RTC in 6 months with CT chest. \par \par \par I, KENDELL Vaz, personally performed the evaluation and management (E/M) services for this established patient who follow up today with an existing condition.  That E/M includes conducting the examination, assessing all new/exacerbated/existing conditions, and establishing a plan of care.  Today, my ACP, Jeanna Villaseñor NP, was here to observe my evaluation and management services for this existing condition to be followed going forward.\par

## 2023-03-26 NOTE — CONSULT LETTER
[Dear  ___] : Dear  [unfilled], [( Thank you for referring [unfilled] for consultation for _____ )] : Thank you for referring [unfilled] for consultation for [unfilled] [Courtesy Letter:] : I had the pleasure of seeing your patient, [unfilled], in my office today. [Please see my note below.] : Please see my note below. [Consult Closing:] : Thank you very much for allowing me to participate in the care of this patient.  If you have any questions, please do not hesitate to contact me. [Sincerely,] : Sincerely, [DrLa  ___] : Dr. PALOMINO [FreeTextEntry2] : Dr. Osmar Sutton (PCP/Referring)\par Dr. Nicholas Klein (pulm) [FreeTextEntry3] : Remigio Moore MD, MPH \par System Director of Thoracic Surgery \par Director of Comprehensive Lung and Fore[facility] [place] \par Professor Cardiovascular & Thoracic Surgery  \par [facility] [employer] [facility]\par \par [facility]\par [address]\par Oncology [address]\[place] [address]\par Tel: [phone]\par Fax: [phone]\par

## 2023-05-15 NOTE — ASU PATIENT PROFILE, ADULT - PRO ARRIVE FROM
home Island Pedicle Flap With Canthal Suspension Text: The defect edges were debeveled with a #15 scalpel blade. Given the location of the defect, shape of the defect and the proximity to free margins an island pedicle advancement flap was deemed most appropriate. Using a sterile surgical marker, an appropriate advancement flap was drawn incorporating the defect, outlining the appropriate donor tissue and placing the expected incisions within the relaxed skin tension lines where possible. The area thus outlined was incised deep to adipose tissue with a #15 scalpel blade. The skin margins were undermined to an appropriate distance in all directions around the primary defect and laterally outward around the island pedicle utilizing iris scissors.  There was minimal undermining beneath the pedicle flap. A suspension suture was placed in the canthal tendon to prevent tension and prevent ectropion. Following this, the designed flap was placed into the primary defect and sutured into place.

## 2023-11-16 ENCOUNTER — APPOINTMENT (OUTPATIENT)
Dept: THORACIC SURGERY | Facility: CLINIC | Age: 78
End: 2023-11-16
Payer: MEDICARE

## 2023-11-16 VITALS
RESPIRATION RATE: 18 BRPM | SYSTOLIC BLOOD PRESSURE: 104 MMHG | WEIGHT: 125 LBS | OXYGEN SATURATION: 93 % | BODY MASS INDEX: 22.14 KG/M2 | TEMPERATURE: 97.3 F | HEART RATE: 118 BPM | DIASTOLIC BLOOD PRESSURE: 67 MMHG

## 2023-11-16 DIAGNOSIS — C34.92 MALIGNANT NEOPLASM OF UNSPECIFIED PART OF LEFT BRONCHUS OR LUNG: ICD-10-CM

## 2023-11-16 PROCEDURE — 99213 OFFICE O/P EST LOW 20 MIN: CPT

## 2023-11-16 RX ORDER — GUAIFENESIN AND PSEUDOEPHEDRINE HYDROCHLORIDE 600; 60 MG/1; MG/1
60-600 TABLET, EXTENDED RELEASE ORAL
Qty: 60 | Refills: 0 | Status: ACTIVE | COMMUNITY
Start: 2023-11-16 | End: 1900-01-01

## 2024-03-08 ENCOUNTER — APPOINTMENT (OUTPATIENT)
Dept: UROLOGY | Facility: CLINIC | Age: 79
End: 2024-03-08
Payer: MEDICARE

## 2024-03-08 VITALS
WEIGHT: 125 LBS | RESPIRATION RATE: 18 BRPM | HEART RATE: 104 BPM | BODY MASS INDEX: 22.14 KG/M2 | SYSTOLIC BLOOD PRESSURE: 109 MMHG | TEMPERATURE: 97.8 F | DIASTOLIC BLOOD PRESSURE: 67 MMHG | OXYGEN SATURATION: 95 %

## 2024-03-08 DIAGNOSIS — R33.9 RETENTION OF URINE, UNSPECIFIED: ICD-10-CM

## 2024-03-08 PROCEDURE — G2211 COMPLEX E/M VISIT ADD ON: CPT

## 2024-03-08 PROCEDURE — 99214 OFFICE O/P EST MOD 30 MIN: CPT

## 2024-03-08 RX ORDER — FINASTERIDE 5 MG/1
5 TABLET, FILM COATED ORAL DAILY
Qty: 90 | Refills: 3 | Status: ACTIVE | COMMUNITY
Start: 2021-08-20 | End: 1900-01-01

## 2024-03-08 RX ORDER — TAMSULOSIN HYDROCHLORIDE 0.4 MG/1
0.4 CAPSULE ORAL
Qty: 180 | Refills: 3 | Status: ACTIVE | COMMUNITY
Start: 2021-08-20 | End: 1900-01-01

## 2024-03-08 NOTE — LETTER BODY
[FreeTextEntry1] : Soha Sutton DO [address] [facility] [address] [phone]  Dear Dr. Sutton  Reason for Visit: Urinary retention. BPH.    This is a 79 year-old gentleman with BPH and previous postoperative urinary retention. The patient developed urinary retention after lung surgery. He required catheterization. He passed his previous voiding trial. The patient returns today for follow up. Since he was last seen, the patient has been taking Flomax BID and Proscar regularly as directed. He is voiding well while on medical therapy. He notes stable urinary symptoms. He denies any urinary retention. Patient denies any gross hematuria or dysuria or urinary incontinence. Patient comes in about bubbles in the urine. He has concern for proteinuria. All other review of systems are negative. Past medical history, family history and social history were inquired and were noncontributory to current condition. Medications and allergies were reviewed. He has no known allergies to medication.    On examination, the patient is a older appearing gentleman in no acute distress. He is alert and oriented follows commands. He has normal mood and affect. He is normocephalic. Oral no thrush. Neck is supple. Respirations are unlabored. His abdomen is soft and nontender. Liver is nonpalpable. Bladder is nonpalpable. No CVA tenderness. Neurologically he is grossly intact. No peripheral edema. Skin without gross abnormality. He has normal male external genitalia. Normal meatus. Bilateral testes are descended intrascrotally and normal to palpation. On rectal examination, there is normal sphincter tone. The prostate is clinically benign without focal induration or nodularity.    His BMP in March 2023 demonstrated normal renal functions, creatinine 0.86. His PSA was 2.15, which is within normal limits.    Assessment: Previous urinary retention. BPH.    I counseled the patient. He is doing well. The patient reports stable urinary symptoms with medical therapy. I recommended the patient continue taking Flomax BID and Proscar. I renewed the patient's prescription for Flomax and Proscar today. I encouraged the patient to continue medications regularly as directed. He will obtain BMP and PSA today to ensure stability. For his concern for proteinuria, he will repeat urinalysis and urine cytology to look for high grade urothelial carcinoma. He will follow up in 1 year to ensure stability. Patient understands that if he develops gross hematuria or any urinary discomfort, he will contact me for further evaluation. Risks and alternatives were discussed. I answered the patient questions. The patient will follow-up as directed and will contact me with any questions or concerns. Thank you for the opportunity to participate in the care of Mr. CASAREZ. I will keep you updated on his progress.  Patient will continue longitudinal care for his complex and serious chronic condition.  Plan: Continue Flomax BID and Proscar. PSA. BMP. Urine Cytology. Urinalysis. Follow up in 1 year.  I spent 30-minutes time today on all issues related to this patient on today date of service including non face to face time.

## 2024-03-08 NOTE — ADDENDUM
[FreeTextEntry1] : Entered by Yefri Garcia, acting as scribe for Dr. Colby Gordon. The documentation recorded by the scribe accurately reflects the service I personally performed and the decisions made by me.

## 2024-03-08 NOTE — HISTORY OF PRESENT ILLNESS
[FreeTextEntry1] : Follow-up BPH.  Flomax.  Patient comes in about bubbles in the urine.  Concern for proteinuria.  Urinalysis cytology.  Follow-up 1 year.  Please refer to URO Consult note

## 2024-03-09 LAB
ANION GAP SERPL CALC-SCNC: 13 MMOL/L
APPEARANCE: CLEAR
BACTERIA: NEGATIVE /HPF
BILIRUBIN URINE: NEGATIVE
BLOOD URINE: NEGATIVE
BUN SERPL-MCNC: 18 MG/DL
CALCIUM OXALATE CRYSTALS: PRESENT
CALCIUM SERPL-MCNC: 10.1 MG/DL
CAST: 0 /LPF
CHLORIDE SERPL-SCNC: 103 MMOL/L
CO2 SERPL-SCNC: 25 MMOL/L
COLOR: YELLOW
CREAT SERPL-MCNC: 0.84 MG/DL
EGFR: 89 ML/MIN/1.73M2
EPITHELIAL CELLS: 0 /HPF
GLUCOSE QUALITATIVE U: NEGATIVE MG/DL
GLUCOSE SERPL-MCNC: 104 MG/DL
KETONES URINE: NEGATIVE MG/DL
LEUKOCYTE ESTERASE URINE: NEGATIVE
MICROSCOPIC-UA: NORMAL
NITRITE URINE: NEGATIVE
PH URINE: 6.5
POTASSIUM SERPL-SCNC: 4.4 MMOL/L
PROTEIN URINE: NEGATIVE MG/DL
PSA FREE FLD-MCNC: 21 %
PSA FREE SERPL-MCNC: 0.41 NG/ML
PSA SERPL-MCNC: 1.91 NG/ML
RED BLOOD CELLS URINE: 2 /HPF
REVIEW: NORMAL
SODIUM SERPL-SCNC: 141 MMOL/L
SPECIFIC GRAVITY URINE: 1.01
UROBILINOGEN URINE: 0.2 MG/DL
WHITE BLOOD CELLS URINE: 0 /HPF

## 2024-03-12 LAB — URINE CYTOLOGY: NORMAL

## 2024-03-19 ENCOUNTER — NON-APPOINTMENT (OUTPATIENT)
Age: 79
End: 2024-03-19

## 2024-11-14 ENCOUNTER — APPOINTMENT (OUTPATIENT)
Dept: THORACIC SURGERY | Facility: CLINIC | Age: 79
End: 2024-11-14
Payer: MEDICARE

## 2024-11-14 VITALS
WEIGHT: 119 LBS | RESPIRATION RATE: 18 BRPM | TEMPERATURE: 97.7 F | HEART RATE: 100 BPM | OXYGEN SATURATION: 95 % | BODY MASS INDEX: 21.08 KG/M2 | SYSTOLIC BLOOD PRESSURE: 96 MMHG | DIASTOLIC BLOOD PRESSURE: 63 MMHG

## 2024-11-14 DIAGNOSIS — R91.1 SOLITARY PULMONARY NODULE: ICD-10-CM

## 2024-11-14 DIAGNOSIS — C34.92 MALIGNANT NEOPLASM OF UNSPECIFIED PART OF LEFT BRONCHUS OR LUNG: ICD-10-CM

## 2024-11-14 PROCEDURE — 99213 OFFICE O/P EST LOW 20 MIN: CPT
